# Patient Record
Sex: FEMALE | Race: WHITE | Employment: FULL TIME | URBAN - METROPOLITAN AREA
[De-identification: names, ages, dates, MRNs, and addresses within clinical notes are randomized per-mention and may not be internally consistent; named-entity substitution may affect disease eponyms.]

---

## 2017-05-10 ENCOUNTER — HOSPITAL ENCOUNTER (OUTPATIENT)
Dept: MRI IMAGING | Age: 37
Discharge: HOME | End: 2017-05-10
Payer: COMMERCIAL

## 2017-05-10 DIAGNOSIS — M99.03 LUMBAR REGION SOMATIC DYSFUNCTION: ICD-10-CM

## 2017-05-10 DIAGNOSIS — M47.26 OSTEOARTHRITIS OF SPINE WITH RADICULOPATHY, LUMBAR REGION: ICD-10-CM

## 2017-05-10 DIAGNOSIS — M99.08 RIB CAGE DYSFUNCTION: ICD-10-CM

## 2017-05-10 DIAGNOSIS — M51.36 DISC DEGENERATION, LUMBAR: ICD-10-CM

## 2017-05-10 DIAGNOSIS — M62.81 MUSCLE WEAKNESS: ICD-10-CM

## 2017-05-10 PROCEDURE — 72148 MRI LUMBAR SPINE W/O DYE: CPT

## 2017-07-19 ENCOUNTER — OFFICE VISIT (OUTPATIENT)
Dept: INTERNAL MEDICINE CLINIC | Age: 37
End: 2017-07-19

## 2017-07-19 VITALS
DIASTOLIC BLOOD PRESSURE: 100 MMHG | WEIGHT: 209 LBS | RESPIRATION RATE: 14 BRPM | HEART RATE: 87 BPM | OXYGEN SATURATION: 97 % | HEIGHT: 65 IN | SYSTOLIC BLOOD PRESSURE: 128 MMHG | TEMPERATURE: 98.3 F | BODY MASS INDEX: 34.82 KG/M2

## 2017-07-19 DIAGNOSIS — Z01.818 PRE-OP EVALUATION: Primary | ICD-10-CM

## 2017-07-19 DIAGNOSIS — R03.0 ELEVATED BLOOD PRESSURE READING: ICD-10-CM

## 2017-07-19 DIAGNOSIS — M51.9 LUMBAR DISC DISORDER: ICD-10-CM

## 2017-07-19 RX ORDER — LEVOTHYROXINE SODIUM 137 UG/1
TABLET ORAL
COMMUNITY
Start: 2016-09-14

## 2017-07-19 RX ORDER — GABAPENTIN 300 MG/1
CAPSULE ORAL
Refills: 0 | COMMUNITY
Start: 2017-06-19 | End: 2017-08-22 | Stop reason: ALTCHOICE

## 2017-07-19 RX ORDER — IBUPROFEN 200 MG
800 TABLET ORAL 2 TIMES DAILY
COMMUNITY

## 2017-07-19 RX ORDER — HYDROCODONE BITARTRATE AND ACETAMINOPHEN 5; 325 MG/1; MG/1
TABLET ORAL
Refills: 0 | COMMUNITY
Start: 2017-07-11

## 2017-07-19 RX ORDER — CLINDAMYCIN HYDROCHLORIDE 300 MG/1
CAPSULE ORAL
Refills: 0 | COMMUNITY
Start: 2017-07-14 | End: 2017-08-22 | Stop reason: ALTCHOICE

## 2017-07-19 NOTE — PROGRESS NOTES
32 yo female in for pre op eval for L5-S1 Lumbar [place] on 8/3/17 by Dr. Say Estrada at [facility]. See scanned in form. Repeat BP - 126/100     Plan: Advised lowering sodium intake and keeping well hydrated   Recheck BP in 5 days  ____________________________  Expected course of current diagnosed problem(s) as well as expected progression and possible complications, and desired follow up with provider are discussed with patient. Patient is encouraged to be back in touch with any questions or concerns. Patient expresses understanding of plan of care. Patient is given AVS which includes diagnoses, current medications, vitals.

## 2017-07-19 NOTE — MR AVS SNAPSHOT
Visit Information Date & Time Provider Department Dept. Phone Encounter #  
 7/19/2017  3:20 PM JORGE LUIS [facility] [phone] [id number] Upcoming Health Maintenance Date Due  
 PAP AKA CERVICAL CYTOLOGY 10/20/2003 INFLUENZA AGE 9 TO ADULT 8/1/2017 DTaP/Tdap/Td series (2 - Td) 3/25/2026 Allergies as of 7/19/2017  Review Complete On: 7/19/2017 By: Royal Abner LPN Severity Noted Reaction Type Reactions Amoxicillin  03/25/2016    Other (comments) Unknown; pt states she had a reaction as a baby. Current Immunizations  Reviewed on 3/25/2016 Name Date Tdap 3/25/2016 Not reviewed this visit You Were Diagnosed With   
  
 Codes Comments Pre-op evaluation    -  Primary ICD-10-CM: T66.365 ICD-9-CM: V72.84 Lumbar disc disorder     ICD-10-CM: M51.9 ICD-9-CM: 722.93 Vitals BP Pulse Temp Resp Height(growth percentile) Weight(growth percentile) (!) 148/96 (BP 1 Location: Left arm, BP Patient Position: Sitting) 87 98.3 °F (36.8 °C) (Oral) 14 5' 5\" (1.651 m) 209 lb (94.8 kg) LMP SpO2 BMI OB Status Smoking Status 07/02/2017 97% 34.78 kg/m2 Having regular periods Never Smoker Vitals History BMI and BSA Data Body Mass Index Body Surface Area 34.78 kg/m 2 2.09 m 2 Preferred Pharmacy Pharmacy Name Phone [id number] [address] [phone] Your Updated Medication List  
  
   
This list is accurate as of: 7/19/17  4:21 PM.  Always use your most recent med list. ADVIL 200 mg tablet Generic drug:  ibuprofen Take  by mouth. clindamycin 300 mg capsule Commonly known as:  CLEOCIN  
TAKE 1 TABLET BY MOUTH 3 TIMES A DAY  
  
 gabapentin 300 mg capsule Commonly known as:  NEURONTIN  
TAKE 2 CAPSULES BY MOUTH 3 TIMES DAILY HYDROcodone-acetaminophen 5-325 mg per tablet Commonly known as:  Stacia Zambrano  
 TAKE 1 TABLET BY MOUTH EVERY 6-8 HOURS AS NEEDED FOR PAIN  
  
 LEVOXYL 137 mcg tablet Generic drug:  levothyroxine 137 mcg = 1 tab each dose, PO, daily, # 30 tab, 0 Refills We Performed the Following CBC WITH AUTOMATED DIFF [38701 CPT(R)] METABOLIC PANEL, COMPREHENSIVE [98129 CPT(R)] URINALYSIS W/MICROSCOPIC [54665 CPT(R)] Introducing [facility]! Judith Marie introduces [employer] patient portal. Now you can access parts of your medical record, email your doctor's office, and request medication refills online. 1. In your internet browser, go to [email or web address] 2. Click on the First Time User? Click Here link in the Sign In box. You will see the New Member Sign Up page. 3. Enter your [employer] Access Code exactly as it appears below. You will not need to use this code after youve completed the sign-up process. If you do not sign up before the expiration date, you must request a new code. · [employer] Access Code: [email or web address] Expires: 8/7/2017  5:56 PM 
 
4. Enter the last four digits of your Social Security Number (xxxx) and Date of Birth (mm/dd/yyyy) as indicated and click Submit. You will be taken to the next sign-up page. 5. Create a [employer] ID. This will be your Win[employer] login ID and cannot be changed, so think of one that is secure and easy to remember. 6. Create a WinDensity password. You can change your password at any time. 7. Enter your Password Reset Question and Answer. This can be used at a later time if you forget your password. 8. Enter your e-mail address. You will receive e-mail notification when new information is available in [address] 9. Click Sign Up. You can now view and download portions of your medical record. 10. Click the Download Summary menu link to download a portable copy of your medical information.  
 
If you have questions, please visit the Frequently Asked Questions section of the [employer]. Remember, ApptheGamehart is NOT to be used for urgent needs. For medical emergencies, dial 911. Now available from your iPhone and Android! Please provide this summary of care documentation to your next provider. Your primary care clinician is listed as Sim Emndosa. If you have any questions after today's visit, please call [phone].

## 2017-07-19 NOTE — PROGRESS NOTES
1. Have you been to the ER, urgent care clinic since your last visit? Hospitalized since your last visit? No    2. Have you seen or consulted any other health care providers outside of the [address] [place] [address] since your last visit? Include any pap smears or colon screening. No     Chief Complaint   Patient presents with    Pre-op Exam     pre-op for for lumbar laminectomy L5-S1 on 8/3/2017 with Dr. Mk Goode.       Not fasting

## 2017-07-20 LAB
ALBUMIN SERPL-MCNC: 4.1 G/DL (ref 3.5–5.5)
ALBUMIN/GLOB SERPL: 1.4 {RATIO} (ref 1.2–2.2)
ALP SERPL-CCNC: 72 IU/L (ref 39–117)
ALT SERPL-CCNC: 21 IU/L (ref 0–32)
APPEARANCE UR: CLEAR
AST SERPL-CCNC: 18 IU/L (ref 0–40)
BACTERIA #/AREA URNS HPF: NORMAL /[HPF]
BASOPHILS # BLD AUTO: 0 X10E3/UL (ref 0–0.2)
BASOPHILS NFR BLD AUTO: 1 %
BILIRUB SERPL-MCNC: 0.2 MG/DL (ref 0–1.2)
BILIRUB UR QL STRIP: NEGATIVE
BUN SERPL-MCNC: 17 MG/DL (ref 6–20)
BUN/CREAT SERPL: 21 (ref 9–23)
CALCIUM SERPL-MCNC: 9.5 MG/DL (ref 8.7–10.2)
CASTS URNS QL MICRO: NORMAL /LPF
CHLORIDE SERPL-SCNC: 100 MMOL/L (ref 96–106)
CO2 SERPL-SCNC: 23 MMOL/L (ref 18–29)
COLOR UR: YELLOW
CREAT SERPL-MCNC: 0.8 MG/DL (ref 0.57–1)
EOSINOPHIL # BLD AUTO: 0.2 X10E3/UL (ref 0–0.4)
EOSINOPHIL NFR BLD AUTO: 2 %
EPI CELLS #/AREA URNS HPF: NORMAL /HPF
ERYTHROCYTE [DISTWIDTH] IN BLOOD BY AUTOMATED COUNT: 12.9 % (ref 12.3–15.4)
GLOBULIN SER CALC-MCNC: 3 G/DL (ref 1.5–4.5)
GLUCOSE SERPL-MCNC: 97 MG/DL (ref 65–99)
GLUCOSE UR QL: NEGATIVE
HCT VFR BLD AUTO: 41.3 % (ref 34–46.6)
HGB BLD-MCNC: 13.6 G/DL (ref 11.1–15.9)
HGB UR QL STRIP: NEGATIVE
IMM GRANULOCYTES # BLD: 0 X10E3/UL (ref 0–0.1)
IMM GRANULOCYTES NFR BLD: 0 %
KETONES UR QL STRIP: NEGATIVE
LEUKOCYTE ESTERASE UR QL STRIP: NEGATIVE
LYMPHOCYTES # BLD AUTO: 2.4 X10E3/UL (ref 0.7–3.1)
LYMPHOCYTES NFR BLD AUTO: 28 %
MCH RBC QN AUTO: 30 PG (ref 26.6–33)
MCHC RBC AUTO-ENTMCNC: 32.9 G/DL (ref 31.5–35.7)
MCV RBC AUTO: 91 FL (ref 79–97)
MICRO URNS: NORMAL
MICRO URNS: NORMAL
MONOCYTES # BLD AUTO: 0.8 X10E3/UL (ref 0.1–0.9)
MONOCYTES NFR BLD AUTO: 9 %
MUCOUS THREADS URNS QL MICRO: PRESENT
NEUTROPHILS # BLD AUTO: 5.2 X10E3/UL (ref 1.4–7)
NEUTROPHILS NFR BLD AUTO: 60 %
NITRITE UR QL STRIP: NEGATIVE
PH UR STRIP: 7 [PH] (ref 5–7.5)
PLATELET # BLD AUTO: 407 X10E3/UL (ref 150–379)
POTASSIUM SERPL-SCNC: 4.4 MMOL/L (ref 3.5–5.2)
PROT SERPL-MCNC: 7.1 G/DL (ref 6–8.5)
PROT UR QL STRIP: NEGATIVE
RBC # BLD AUTO: 4.53 X10E6/UL (ref 3.77–5.28)
RBC #/AREA URNS HPF: NORMAL /HPF
SODIUM SERPL-SCNC: 140 MMOL/L (ref 134–144)
SP GR UR: 1.02 (ref 1–1.03)
UROBILINOGEN UR STRIP-MCNC: 0.2 MG/DL (ref 0.2–1)
WBC # BLD AUTO: 8.7 X10E3/UL (ref 3.4–10.8)
WBC #/AREA URNS HPF: NORMAL /HPF

## 2017-07-24 ENCOUNTER — OFFICE VISIT (OUTPATIENT)
Dept: INTERNAL MEDICINE CLINIC | Age: 37
End: 2017-07-24

## 2017-07-24 VITALS
BODY MASS INDEX: 34.89 KG/M2 | HEART RATE: 80 BPM | WEIGHT: 209.4 LBS | HEIGHT: 65 IN | OXYGEN SATURATION: 98 % | DIASTOLIC BLOOD PRESSURE: 100 MMHG | RESPIRATION RATE: 16 BRPM | TEMPERATURE: 98.2 F | SYSTOLIC BLOOD PRESSURE: 128 MMHG

## 2017-07-24 DIAGNOSIS — R03.0 ELEVATED BLOOD PRESSURE, SITUATIONAL: Primary | ICD-10-CM

## 2017-07-24 NOTE — MR AVS SNAPSHOT
Visit Information Date & Time Provider Department Dept. Phone Encounter #  
 7/24/2017  8:40 AM JORGE LUIS [place]  [facility] [phone] Follow-up Instructions Return in about 6 weeks (around 9/4/2017) for establish for care and BP f/u. Upcoming Health Maintenance Date Due  
 PAP AKA CERVICAL CYTOLOGY 10/20/2003 INFLUENZA AGE 9 TO ADULT 8/1/2017 DTaP/Tdap/Td series (2 - Td) 3/25/2026 Allergies as of 7/24/2017  Review Complete On: 7/24/2017 By: Ashlie Arauz Severity Noted Reaction Type Reactions Amoxicillin  03/25/2016    Other (comments) Unknown; pt states she had a reaction as a baby. Current Immunizations  Reviewed on 3/25/2016 Name Date Tdap 3/25/2016 Not reviewed this visit You Were Diagnosed With   
  
 Codes Comments Elevated blood pressure, situational    -  Primary ICD-10-CM: R03.0 ICD-9-CM: 796.2 Vitals BP Pulse Temp Resp Height(growth percentile) Weight(growth percentile) (!) 128/100 (BP 1 Location: Right arm) 80 98.2 °F (36.8 °C) (Oral) 16 5' 5\" (1.651 m) 209 lb 6.4 oz (95 kg) LMP SpO2 BMI OB Status Smoking Status 07/02/2017 98% 34.85 kg/m2 Having regular periods Never Smoker Vitals History BMI and BSA Data Body Mass Index Body Surface Area 34.85 kg/m 2 2.09 m 2 Preferred Pharmacy Pharmacy Name Phone [id number], [address] [facility] [phone] Your Updated Medication List  
  
   
This list is accurate as of: 7/24/17  9:23 AM.  Always use your most recent med list. ADVIL 200 mg tablet Generic drug:  ibuprofen Take 800 mg by mouth two (2) times a day. clindamycin 300 mg capsule Commonly known as:  CLEOCIN  
TAKE 1 TABLET BY MOUTH 3 TIMES A DAY  
  
 gabapentin 300 mg capsule Commonly known as:  NEURONTIN  
TAKE 2 CAPSULES BY MOUTH 3 TIMES DAILY HYDROcodone-acetaminophen 5-325 mg per tablet Commonly known as:  NORCO  
TAKE 1 TABLET BY MOUTH EVERY 6-8 HOURS AS NEEDED FOR PAIN  
  
 LEVOXYL 137 mcg tablet Generic drug:  levothyroxine 137 mcg = 1 tab each dose, PO, daily, # 30 tab, 0 Refills Follow-up Instructions Return in about 6 weeks (around 9/4/2017) for establish for care and BP f/u. Introducing [facility] & HE[facility] SERVICES! Ashley Prince introduces [facility] patient portal. Now you can access parts of your medical record, email your doctor's office, and request medication refills online. 1. In your internet browser, go to [email or web address] 2. Click on the First Time User? Click Here link in the Sign In box. You will see the New Member Sign Up page. 3. Enter your [facility] Access Code exactly as it appears below. You will not need to use this code after youve completed the sign-up process. If you do not sign up before the expiration date, you must request a new code. · [facility] Access Code: [email or web address] Expires: 8/7/2017  5:56 PM 
 
4. Enter the last four digits of your Social Security Number (xxxx) and Date of Birth (mm/dd/yyyy) as indicated and click Submit. You will be taken to the next sign-up page. 5. Create a [facility] ID. This will be your [facility] login ID and cannot be changed, so think of one that is secure and easy to remember. 6. Create a [facility] password. You can change your password at any time. 7. Enter your Password Reset Question and Answer. This can be used at a later time if you forget your password. 8. Enter your e-mail address. You will receive e-mail notification when new information is available in [address] 9. Click Sign Up. You can now view and download portions of your medical record. 10. Click the Download Summary menu link to download a portable copy of your medical information.  
 
If you have questions, please visit the Frequently Asked Questions section of the [employer]. Remember, [employer]DOMhart is NOT to be used for urgent needs. For medical emergencies, dial 911. Now available from your iPhone and Android! Please provide this summary of care documentation to your next provider. Your primary care clinician is listed as Rebekah Neal. If you have any questions after today's visit, please call [phone].

## 2017-07-24 NOTE — PROGRESS NOTES
30 yo female returns for BP f/u. She is pre-op for lumbar surgery on Aug 3. She has been using an NSAID consistently for at least a month for her sciatic back pain. PE: Overweight WF   Repeat BP - R = 128/100 & L = 128/94 (she is also in pain with her back at this time)    Imp: Elevated BP - possibly due to NSAID use    Plan: Discussed avoidance of sodium in diet   Labs show normal renal fx   Will clear for surgery   F/U and establish for care with Marty Estrella NP, in 6 weeks  ___________________________  Expected course of current diagnosed problem(s) as well as expected progression and possible complications, and desired follow up with provider are discussed with patient. Patient is encouraged to be back in touch with any questions or concerns. Patient expresses understanding of plan of care. Patient is given AVS which includes diagnoses, current medications, vitals.

## 2017-07-24 NOTE — PROGRESS NOTES
1. Have you been to the ER, urgent care clinic since your last visit? Hospitalized since your last visit? No    2. Have you seen or consulted any other health care providers outside of the [facility] since your last visit? Include any pap smears or colon screening. No     Chief Complaint   Patient presents with    Hypertension     5 day follow up       Not Fasting.

## 2017-08-22 ENCOUNTER — OFFICE VISIT (OUTPATIENT)
Dept: INTERNAL MEDICINE CLINIC | Age: 37
End: 2017-08-22

## 2017-08-22 VITALS
DIASTOLIC BLOOD PRESSURE: 74 MMHG | TEMPERATURE: 98.2 F | HEART RATE: 85 BPM | OXYGEN SATURATION: 98 % | HEIGHT: 65 IN | BODY MASS INDEX: 34.82 KG/M2 | SYSTOLIC BLOOD PRESSURE: 130 MMHG | WEIGHT: 209 LBS | RESPIRATION RATE: 16 BRPM

## 2017-08-22 DIAGNOSIS — K21.9 GASTROESOPHAGEAL REFLUX DISEASE, ESOPHAGITIS PRESENCE NOT SPECIFIED: ICD-10-CM

## 2017-08-22 DIAGNOSIS — J02.9 SORE THROAT: Primary | ICD-10-CM

## 2017-08-22 LAB
S PYO AG THROAT QL: NEGATIVE
VALID INTERNAL CONTROL?: YES

## 2017-08-22 RX ORDER — RANITIDINE 150 MG/1
150 TABLET, FILM COATED ORAL 2 TIMES DAILY
Qty: 28 TAB | Refills: 0 | Status: SHIPPED | OUTPATIENT
Start: 2017-08-22 | End: 2017-09-05

## 2017-08-22 NOTE — PATIENT INSTRUCTIONS

## 2017-08-22 NOTE — PROGRESS NOTES
HISTORY OF PRESENT ILLNESS  Helen Post is a 31 y.o. female. Patient reports sore throat for 6 days on and off. She has been doing a new diet for the last 3 weeks, which includes a daily shake with berries. She doesn't do the shakes on the weekends and reports she didn't notice the sore throat over the weekend. She has also been eating a lot of fresh vegetables. She had tomatoes over the weekend. She denies fever, chills, cough, or congestion. She had one day of hoarseness. She has felt the need to burp frequently. She has history of heartburn, but this feels different. She also has history of allergies and is not taking anything currently. Visit Vitals    /74 (BP 1 Location: Left arm, BP Patient Position: Sitting)    Pulse 85    Temp 98.2 °F (36.8 °C) (Oral)    Resp 16    Ht 5' 5\" (1.651 m)    Wt 209 lb (94.8 kg)    LMP 08/17/2017    SpO2 98%    BMI 34.78 kg/m2       Sore Throat    The history is provided by the patient. This is a new problem. Episode onset: 6 days ago. There has been no fever. Pertinent negatives include no congestion and no cough. Treatments tried: 1 dose of zantac. The treatment provided no relief. Review of Systems   HENT: Positive for sore throat. Negative for congestion. Respiratory: Negative for cough. Physical Exam   Constitutional: She is oriented to person, place, and time. She appears well-developed and well-nourished. HENT:   Head: Normocephalic. Right Ear: Hearing, tympanic membrane, external ear and ear canal normal.   Left Ear: Hearing, tympanic membrane, external ear and ear canal normal.   Nose: Mucosal edema present. Mouth/Throat: Uvula is midline, oropharynx is clear and moist and mucous membranes are normal.   Cardiovascular: Normal rate, regular rhythm and normal heart sounds. Pulmonary/Chest: Effort normal and breath sounds normal.   Lymphadenopathy:     She has no cervical adenopathy.    Neurological: She is alert and oriented to person, place, and time. Skin: Skin is warm and dry. Psychiatric: She has a normal mood and affect. ASSESSMENT and PLAN    ICD-10-CM ICD-9-CM    1. Sore throat J02.9 462 AMB POC RAPID STREP A      UPPER RESPIRATORY CULTURE   2.  Gastroesophageal reflux disease, esophagitis presence not specified K21.9 530.81      Orders Placed This Encounter    UPPER RESPIRATORY CULTURE    AMB POC RAPID STREP A    raNITIdine (ZANTAC) 150 mg tablet   start zantac twice daily; follow-up if no relief in 1 week  Avoid trigger foods as discussed, such as tomatoes, acidic fruit, caffeine, coffee, wine  Eat 5-6 small meals  Avoid eating 2-3 hours before bed

## 2017-08-22 NOTE — MR AVS SNAPSHOT
Visit Information Date & Time Provider Department Dept. Phone Encounter #  
 8/22/2017  3:45 PM JORGE LUIS [facility] [phone] Upcoming Health Maintenance Date Due  
 PAP AKA CERVICAL CYTOLOGY 10/20/2003 INFLUENZA AGE 9 TO ADULT 8/1/2017 DTaP/Tdap/Td series (2 - Td) 3/25/2026 Allergies as of 8/22/2017  Review Complete On: 8/22/2017 By: Latrell Liu Severity Noted Reaction Type Reactions Amoxicillin  03/25/2016    Other (comments) Unknown; pt states she had a reaction as a baby. Current Immunizations  Reviewed on 3/25/2016 Name Date Tdap 3/25/2016 Not reviewed this visit You Were Diagnosed With   
  
 Codes Comments Sore throat    -  Primary ICD-10-CM: J02.9 ICD-9-CM: 145 Gastroesophageal reflux disease, esophagitis presence not specified     ICD-10-CM: K21.9 ICD-9-CM: 530.81 Vitals BP Pulse Temp Resp Height(growth percentile) Weight(growth percentile) 130/74 (BP 1 Location: Left arm, BP Patient Position: Sitting) 85 98.2 °F (36.8 °C) (Oral) 16 5' 5\" (1.651 m) 209 lb (94.8 kg) LMP SpO2 BMI OB Status Smoking Status 08/17/2017 98% 34.78 kg/m2 Having regular periods Never Smoker Vitals History BMI and BSA Data Body Mass Index Body Surface Area 34.78 kg/m 2 2.09 m 2 Preferred Pharmacy Pharmacy Name Phone [id number], [address] [phone] Your Updated Medication List  
  
   
This list is accurate as of: 8/22/17  4:35 PM.  Always use your most recent med list. ADVIL 200 mg tablet Generic drug:  ibuprofen Take 800 mg by mouth two (2) times a day. HYDROcodone-acetaminophen 5-325 mg per tablet Commonly known as:  NORCO  
TAKE 1 TABLET BY MOUTH EVERY 6-8 HOURS AS NEEDED FOR PAIN  
  
 LEVOXYL 137 mcg tablet Generic drug:  levothyroxine 137 mcg = 1 tab each dose, PO, daily, # 30 tab, 0 Refills  
  
 raNITIdine 150 mg tablet Commonly known as:  ZANTAC Take 1 Tab by mouth two (2) times a day for 14 days. Prescriptions Sent to Pharmacy Refills  
 raNITIdine (ZANTAC) 150 mg tablet 0 Sig: Take 1 Tab by mouth two (2) times a day for 14 days. Class: Normal  
 Pharmacy: [address] [place] [address] #: [phone] Route: Oral  
  
We Performed the Following AMB POC RAPID STREP A [17448 CPT(R)] Patient Instructions Gastroesophageal Reflux Disease (GERD): Care Instructions Your Care Instructions Gastroesophageal reflux disease (GERD) is the backward flow of stomach acid into the esophagus. The esophagus is the tube that leads from your throat to your stomach. A one-way valve prevents the stomach acid from moving up into this tube. When you have GERD, this valve does not close tightly enough. If you have mild GERD symptoms including heartburn, you may be able to control the problem with antacids or over-the-counter medicine. Changing your diet, losing weight, and making other lifestyle changes can also help reduce symptoms. Follow-up care is a key part of your treatment and safety. Be sure to make and go to all appointments, and call your doctor if you are having problems. Its also a good idea to know your test results and keep a list of the medicines you take. How can you care for yourself at home? · Take your medicines exactly as prescribed. Call your doctor if you think you are having a problem with your medicine. · Your doctor may recommend over-the-counter medicine. For mild or occasional indigestion, antacids, such as Tums, Gaviscon, Mylanta, or Maalox, may help. Your doctor also may recommend over-the-counter acid reducers, such as Pepcid AC, Tagamet HB, Zantac 75, or Prilosec. Read and follow all instructions on the label.  If you use these medicines often, talk with your doctor. · Change your eating habits. ¨ Its best to eat several small meals instead of two or three large meals. ¨ After you eat, wait 2 to 3 hours before you lie down. ¨ Chocolate, mint, and alcohol can make GERD worse. ¨ Spicy foods, foods that have a lot of acid (like tomatoes and oranges), and coffee can make GERD symptoms worse in some people. If your symptoms are worse after you eat a certain food, you may want to stop eating that food to see if your symptoms get better. · Do not smoke or chew tobacco. Smoking can make GERD worse. If you need help quitting, talk to your doctor about stop-smoking programs and medicines. These can increase your chances of quitting for good. · If you have GERD symptoms at night, raise the head of your bed 6 to 8 inches by putting the frame on blocks or placing a foam wedge under the head of your mattress. (Adding extra pillows does not work.) · Do not wear tight clothing around your middle. · Lose weight if you need to. Losing just 5 to 10 pounds can help. When should you call for help? Call your doctor now or seek immediate medical care if: 
· You have new or different belly pain. · Your stools are black and tarlike or have streaks of blood. Watch closely for changes in your health, and be sure to contact your doctor if: 
· Your symptoms have not improved after 2 days. · Food seems to catch in your throat or chest. 
Where can you learn more? Go to http://dudley-washington.info/. Enter G101 in the search box to learn more about \"Gastroesophageal Reflux Disease (GERD): Care Instructions. \" Current as of: August 9, 2016 Content Version: 11.3 © 6083-1570 [employer]. Care instructions adapted under license by [employer] (which disclaims liability or warranty for this information).  If you have questions about a medical condition or this instruction, always ask your healthcare professional. [employer] disclaims any warranty or liability for your use of this information. Introducing [facility] & HEALTH SERVICES! [employer] introduces [employer] patient portal. Now you can access parts of your medical record, email your doctor's office, and request medication refills online. 1. In your internet browser, go to [email or web address] 2. Click on the First Time User? Click Here link in the Sign In box. You will see the New Member Sign Up page. 3. Enter your [employer] Access Code exactly as it appears below. You will not need to use this code after youve completed the sign-up process. If you do not sign up before the expiration date, you must request a new code. · [employer] Access Code: [email or web address] Expires: 11/20/2017  4:26 PM 
 
4. Enter the last four digits of your Social Security Number (xxxx) and Date of Birth (mm/dd/yyyy) as indicated and click Submit. You will be taken to the next sign-up page. 5. Create a [employer] ID. This will be your [employer] login ID and cannot be changed, so think of one that is secure and easy to remember. 6. Create a [employer] password. You can change your password at any time. 7. Enter your Password Reset Question and Answer. This can be used at a later time if you forget your password. 8. Enter your e-mail address. You will receive e-mail notification when new information is available in [address] 9. Click Sign Up. You can now view and download portions of your medical record. 10. Click the Download Summary menu link to download a portable copy of your medical information. If you have questions, please visit the Frequently Asked Questions section of the [employer] website. Remember, [employer] is NOT to be used for urgent needs. For medical emergencies, dial 911. Now available from your iPhone and Android! Please provide this summary of care documentation to your next provider. Your primary care clinician is listed as Candice Driscoll. If you have any questions after today's visit, please call [phone].

## 2017-08-22 NOTE — PROGRESS NOTES
1. Have you been to the ER, urgent care clinic since your last visit? Hospitalized since your last visit? No    2. Have you seen or consulted any other health care providers outside of the [address] [place] [address] since your last visit? Include any pap smears or colon screening. No  Chief Complaint   Patient presents with    Sore Throat     feels like acid reflux x 5 days.  no fever chills or body aches

## 2017-08-25 LAB
BACTERIA SPEC RESP CULT: ABNORMAL
BACTERIA SPEC RESP CULT: ABNORMAL

## 2017-08-28 ENCOUNTER — TELEPHONE (OUTPATIENT)
Dept: FAMILY MEDICINE CLINIC | Age: 37
End: 2017-08-28

## 2017-08-28 RX ORDER — AZITHROMYCIN 250 MG/1
TABLET, FILM COATED ORAL
Qty: 6 TAB | Refills: 0 | Status: SHIPPED | OUTPATIENT
Start: 2017-08-28 | End: 2017-09-02

## 2017-08-28 NOTE — TELEPHONE ENCOUNTER
Message left after attempting to call patient twice with no answer.  Antibiotic sent to pharmacy on file due to positive strep C

## 2017-08-29 ENCOUNTER — TELEPHONE (OUTPATIENT)
Dept: INTERNAL MEDICINE CLINIC | Age: 37
End: 2017-08-29

## 2017-08-30 ENCOUNTER — TELEPHONE (OUTPATIENT)
Dept: INTERNAL MEDICINE CLINIC | Age: 37
End: 2017-08-30

## 2017-08-30 NOTE — TELEPHONE ENCOUNTER
Patient notified of abnormal culture; patient had received my message 2 days ago and started antibiotic. She is feeling better. advised new toothbrush 24 hours after starting antibiotic.

## 2023-03-20 ENCOUNTER — OFFICE VISIT (OUTPATIENT)
Dept: GYNECOLOGY | Age: 43
End: 2023-03-20
Payer: COMMERCIAL

## 2023-03-20 VITALS
SYSTOLIC BLOOD PRESSURE: 119 MMHG | HEART RATE: 99 BPM | DIASTOLIC BLOOD PRESSURE: 82 MMHG | WEIGHT: 218 LBS | HEIGHT: 65 IN | BODY MASS INDEX: 36.32 KG/M2

## 2023-03-20 DIAGNOSIS — Z01.818 PREOP TESTING: ICD-10-CM

## 2023-03-20 DIAGNOSIS — D25.1 INTRAMURAL LEIOMYOMA OF UTERUS: Primary | ICD-10-CM

## 2023-03-20 LAB
ALBUMIN SERPL-MCNC: 4 G/DL (ref 3.8–4.8)
ALBUMIN/GLOB SERPL: 1.5 {RATIO} (ref 1.2–2.2)
ALP SERPL-CCNC: 78 IU/L (ref 44–121)
ALT SERPL-CCNC: 10 IU/L (ref 0–32)
AST SERPL-CCNC: 13 IU/L (ref 0–40)
BASOPHILS # BLD AUTO: 0.1 X10E3/UL (ref 0–0.2)
BASOPHILS NFR BLD AUTO: 1 %
BILIRUB SERPL-MCNC: 0.2 MG/DL (ref 0–1.2)
BUN SERPL-MCNC: 10 MG/DL (ref 6–24)
BUN/CREAT SERPL: 17 (ref 9–23)
CALCIUM SERPL-MCNC: 9.4 MG/DL (ref 8.7–10.2)
CHLORIDE SERPL-SCNC: 98 MMOL/L (ref 96–106)
CO2 SERPL-SCNC: 25 MMOL/L (ref 20–29)
CREAT SERPL-MCNC: 0.58 MG/DL (ref 0.57–1)
EGFRCR SERPLBLD CKD-EPI 2021: 117 ML/MIN/1.73
EOSINOPHIL # BLD AUTO: 0.3 X10E3/UL (ref 0–0.4)
EOSINOPHIL NFR BLD AUTO: 3 %
ERYTHROCYTE [DISTWIDTH] IN BLOOD BY AUTOMATED COUNT: 16.8 % (ref 11.7–15.4)
GLOBULIN SER CALC-MCNC: 2.7 G/DL (ref 1.5–4.5)
GLUCOSE SERPL-MCNC: 98 MG/DL (ref 70–99)
HCT VFR BLD AUTO: 26.8 % (ref 34–46.6)
HGB BLD-MCNC: 7.9 G/DL (ref 11.1–15.9)
LYMPHOCYTES # BLD AUTO: 1.6 X10E3/UL (ref 0.7–3.1)
LYMPHOCYTES NFR BLD AUTO: 18 %
MCH RBC QN AUTO: 19.2 PG (ref 26.6–33)
MCHC RBC AUTO-ENTMCNC: 29.5 G/DL (ref 31.5–35.7)
MCV RBC AUTO: 65 FL (ref 79–97)
MONOCYTES # BLD AUTO: 0.6 X10E3/UL (ref 0.1–0.9)
MONOCYTES NFR BLD AUTO: 7 %
MORPHOLOGY BLD-IMP: ABNORMAL
NEUTROPHILS # BLD AUTO: 6.2 X10E3/UL (ref 1.4–7)
NEUTROPHILS NFR BLD AUTO: 71 %
PLATELET # BLD AUTO: 464 X10E3/UL (ref 150–450)
POTASSIUM SERPL-SCNC: 4.6 MMOL/L (ref 3.5–5.2)
PROT SERPL-MCNC: 6.7 G/DL (ref 6–8.5)
RBC # BLD AUTO: 4.11 X10E6/UL (ref 3.77–5.28)
SODIUM SERPL-SCNC: 133 MMOL/L (ref 134–144)
WBC # BLD AUTO: 8.8 X10E3/UL (ref 3.4–10.8)

## 2023-03-20 PROCEDURE — 99204 OFFICE O/P NEW MOD 45 MIN: CPT | Performed by: OBSTETRICS & GYNECOLOGY

## 2023-03-20 RX ORDER — ERGOCALCIFEROL 1.25 MG/1
CAPSULE ORAL
COMMUNITY
Start: 2023-02-23

## 2023-03-20 RX ORDER — LEVOTHYROXINE SODIUM 175 UG/1
TABLET ORAL
COMMUNITY
Start: 2023-03-02 | End: 2023-03-21

## 2023-03-20 RX ORDER — IRON,CARBONYL/ASCORBIC ACID 65MG-125MG
TABLET, DELAYED RELEASE (ENTERIC COATED) ORAL
COMMUNITY
End: 2023-03-23

## 2023-03-20 NOTE — PROGRESS NOTES
27 [facility] [address], [address] [facility]  P [phone]  F [phone]    Office Note  Patient ID:  Name:  Freya Fowler  MRN:  [id number]  :  1980/42 y.o. Date:  3/20/2023      HISTORY OF PRESENT ILLNESS:  Freya Fowler is a 38 y.o.  premenopausal female who is a new patient being seen for uterine fibroids. She is referred by Dr. Mark Francis with [facility]. She is symptomatic and anemic from the fibroids. Her last Hgb was 8.8. Options were discussed with her and she wished to proceed with hysterectomy. I have been asked to see her in consultation. ROS:   and GI review:  Negative  Cardiopulmonary review:  Negative   Musculoskeletal:  Negative    A comprehensive review of systems was negative except for that written in the History of Present Illness. , 10 point ROS      OB/GYN ROS/HX:          Problem List:  Patient Active Problem List    Diagnosis Date Noted    Advanced care planning/counseling discussion 2016    Uterine fibroid 2016    Hypothyroidism 2015     PMH:  Past Medical History:   Diagnosis Date    Chronic pain     Hypothyroidism 2015    Dr. Shama Harmon of skin [facility]) 2016    R distal thigh (Dr. Philipp Frankel at [address] [facility]) - later pathology showed NOT melanoma    Uterine fibroid 3/2016      PSH:  Past Surgical History:   Procedure Laterality Date    HX OTHER SURGICAL Right 2015    dermatofibroma excision (upper R thigh), Dr. Dalila Keyes     [facility] [address] [place] [place] Right 2016    spitz nevus exc R knee (pathology revealed scar), Dr. Philipp Frankel at [address] [facility] History:  Social History     Tobacco Use    Smoking status: Never    Smokeless tobacco: Never   Substance Use Topics    Alcohol use: Yes     Comment: 4 beers/wine a week      Family History:  Family History   Problem Relation Age of Onset    No Known Problems Mother     No Known Problems Father     Cancer Maternal Grandmother         ovarian    Cancer Maternal Grandfather         lung & liver CA    Cancer Paternal Grandmother         rectal CA    Anesth Problems Neg Hx       Medications: (reviewed)  Current Outpatient Medications   Medication Sig    levothyroxine (SYNTHROID) 175 mcg tablet 1 tab(s) orally once a day for 90 days    ergocalciferol (ERGOCALCIFEROL) 1,250 mcg (50,000 unit) capsule TAKE 1 CAPSULE BY MOUTH WEEKLY FOR 90 DAYS    iron,carbonyl-vitamin C (Vitron-C) 65 mg iron- 125 mg TbEC Take  by mouth. HYDROcodone-acetaminophen (NORCO) 5-325 mg per tablet TAKE 1 TABLET BY MOUTH EVERY 6-8 HOURS AS NEEDED FOR PAIN (Patient not taking: Reported on 3/20/2023)    levothyroxine (SYNTHROID) 137 mcg tablet   137 mcg = 1 tab each dose, PO, daily, # 30 tab, 0 Refills (Patient not taking: Reported on 3/20/2023)    ibuprofen (MOTRIN) 200 mg tablet Take 800 mg by mouth two (2) times a day. (Patient not taking: Reported on 3/20/2023)     No current facility-administered medications for this visit. Allergies: (reviewed)  Allergies   Allergen Reactions    Amoxicillin Other (comments)     Unknown; pt states she had a reaction as a baby. OBJECTIVE:    Physical Exam:  VITAL SIGNS: Vitals:    03/20/23 1047   BP: 119/82   Pulse: 99   Weight: 218 lb (98.9 kg)   Height: 5' 5\" (1.651 m)     Body mass index is 36.28 kg/m². GENERAL FAWN: Conversant, alert, oriented. No acute distress. HEENT: HEENT. No thyroid enlargement. No JVD. Neck: Supple without restrictions. RESPIRATORY: Clear to auscultation and percussion to the bases. No CVAT. CARDIOVASC: RRR without murmur/rub. GASTROINT: soft, non-tender, without masses or organomegaly   MUSCULOSKEL: no joint tenderness, deformity or swelling   EXTREMITIES: extremities normal, atraumatic, no cyanosis or edema   PELVIC: Normal external genitalia. Normal vagina and cervix. Uterus enlarged, with multiple fibroids, but somewhat mobile.   Adnexa not palpable. RECTAL: Deferred   JAMARI SURVEY: No suspicious lymphadenopathy or edema noted. NEURO: Grossly intact. No acute deficit. Lab Data:    Lab Results   Component Value Date/Time    WBC 8.7 07/19/2017 04:50 PM    HGB 13.6 07/19/2017 04:50 PM    HCT 41.3 07/19/2017 04:50 PM    PLATELET 286 (H) 75/20/6918 04:50 PM    MCV 91 07/19/2017 04:50 PM     Lab Results   Component Value Date/Time    Sodium 140 07/19/2017 04:50 PM    Potassium 4.4 07/19/2017 04:50 PM    Chloride 100 07/19/2017 04:50 PM    CO2 23 07/19/2017 04:50 PM    Glucose 97 07/19/2017 04:50 PM    BUN 17 07/19/2017 04:50 PM    Creatinine 0.80 07/19/2017 04:50 PM    BUN/Creatinine ratio 21 07/19/2017 04:50 PM    GFR est  07/19/2017 04:50 PM    GFR est non-AA 96 07/19/2017 04:50 PM    Calcium 9.5 07/19/2017 04:50 PM         Imaging: Outside ultrasound report from [address] [facility] reviewed. Enlarged fibroid uterus measuring 9.3 x 7.5 x 11.2 cm. Multiple fibroids present, the largest measuring 7.4 cm in greatest dimension. Right ovary not seen. Left ovary mildly enlarged. No free fluid. IMPRESSION/PLAN:  Nirmal Phillips is a 38 y.o. female with a diagnosis of symptomatic uterine fibroids. She does not desire future fertility and would like to proceed with definitive management. I reviewed with Nirmal Phillips her medical records, physical exam, and review of symptoms. Despite the size of her uterus, I still think the procedure can be accomplished in a minimally invasive fashion. I suggested a robotic approach, as I think I might have to perform multiple myomectomies at the end of the procedure to allow for transvaginal removal of the specimen. I suggested that we leave her ovaries, as long as they appear normal, to prevent the acute onset of menopause. She was counseled on the risks, benefits, indications and alternatives of the planned procedure.   We discussed the possible surgical risks of bleeding, infection, potential injury to other organs/structures, and the risks of anesthesia. Her questions were answered to her satisfaction and she wishes to proceed as planned. An electronic signature was used to sign this note.     Lissette Gagnon MD  03/20/23

## 2023-03-20 NOTE — LETTER
3/20/2023 11:36 AM    Patient:  Nirmal Phillips   YOB: 1980  Date of Visit: 3/20/2023      Dear Sue Ramon MD  [address] [address]  Via Fax: [phone]: Thank you for referring Ms. Tiffany Vicente to me for evaluation/treatment. Below are the relevant portions of my assessment and plan of care. New patient referred by  for fibroids. [address] [place] [address] [facility] [address] [facility] [phone]  F [phone]    Office Note  Patient ID:  Name:  Nirmal Phillips  MRN:  [id number]  :  1980/42 y.o. Date:  3/20/2023      HISTORY OF PRESENT ILLNESS:  Nirmal Phillips is a 38 y.o.  premenopausal female who is a new patient being seen for uterine fibroids. She is referred by Dr. Akash Driver with [facility]. She is symptomatic and anemic from the fibroids. Her last Hgb was 8.8. Options were discussed with her and she wished to proceed with hysterectomy. I have been asked to see her in consultation. ROS:   and GI review:  Negative  Cardiopulmonary review:  Negative   Musculoskeletal:  Negative    A comprehensive review of systems was negative except for that written in the History of Present Illness. , 10 point ROS      OB/GYN ROS/HX:          Problem List:  Patient Active Problem List    Diagnosis Date Noted    Advanced care planning/counseling discussion 2016    Uterine fibroid 2016    Hypothyroidism 2015     PMH:  Past Medical History:   Diagnosis Date    Chronic pain     Hypothyroidism 2015    Dr. Mike Boothe of skin [facility]) 2016    R distal thigh (Dr. Jane Reddy at [address] [place] [address]) - later pathology showed NOT melanoma    Uterine fibroid 3/2016      PSH:  Past Surgical History:   Procedure Laterality Date    HX OTHER SURGICAL Right 2015    dermatofibroma excision (upper R thigh), Dr. Le Denny SURGICAL Right 09/14/2016    spitz nevus exc R knee (pathology revealed scar), Dr. Washington Silveira at [address] History:  Social History     Tobacco Use    Smoking status: Never    Smokeless tobacco: Never   Substance Use Topics    Alcohol use: Yes     Comment: 4 beers/wine a week      Family History:  Family History   Problem Relation Age of Onset    No Known Problems Mother     No Known Problems Father     Cancer Maternal Grandmother         ovarian    Cancer Maternal Grandfather         lung & liver CA    Cancer Paternal Grandmother         rectal CA    Anesth Problems Neg Hx       Medications: (reviewed)  Current Outpatient Medications   Medication Sig    levothyroxine (SYNTHROID) 175 mcg tablet 1 tab(s) orally once a day for 90 days    ergocalciferol (ERGOCALCIFEROL) 1,250 mcg (50,000 unit) capsule TAKE 1 CAPSULE BY MOUTH WEEKLY FOR 90 DAYS    iron,carbonyl-vitamin C (Vitron-C) 65 mg iron- 125 mg TbEC Take  by mouth.  HYDROcodone-acetaminophen (NORCO) 5-325 mg per tablet TAKE 1 TABLET BY MOUTH EVERY 6-8 HOURS AS NEEDED FOR PAIN (Patient not taking: Reported on 3/20/2023)    levothyroxine (SYNTHROID) 137 mcg tablet   137 mcg = 1 tab each dose, PO, daily, # 30 tab, 0 Refills (Patient not taking: Reported on 3/20/2023)    ibuprofen (MOTRIN) 200 mg tablet Take 800 mg by mouth two (2) times a day. (Patient not taking: Reported on 3/20/2023)     No current facility-administered medications for this visit. Allergies: (reviewed)  Allergies   Allergen Reactions    Amoxicillin Other (comments)     Unknown; pt states she had a reaction as a baby. OBJECTIVE:    Physical Exam:  VITAL SIGNS: Vitals:    03/20/23 1047   BP: 119/82   Pulse: 99   Weight: 218 lb (98.9 kg)   Height: 5' 5\" (1.651 m)     Body mass index is 36.28 kg/m². GENERAL FAWN: Conversant, alert, oriented. No acute distress. HEENT: HEENT. No thyroid enlargement. No JVD.    Neck: Supple without restrictions. RESPIRATORY: Clear to auscultation and percussion to the bases. No CVAT. CARDIOVASC: RRR without murmur/rub. GASTROINT: soft, non-tender, without masses or organomegaly   MUSCULOSKEL: no joint tenderness, deformity or swelling   EXTREMITIES: extremities normal, atraumatic, no cyanosis or edema   PELVIC: Normal external genitalia. Normal vagina and cervix. Uterus enlarged, with multiple fibroids, but somewhat mobile. Adnexa not palpable. RECTAL: Deferred   JAMARI SURVEY: No suspicious lymphadenopathy or edema noted. NEURO: Grossly intact. No acute deficit. Lab Data:    Lab Results   Component Value Date/Time    WBC 8.7 07/19/2017 04:50 PM    HGB 13.6 07/19/2017 04:50 PM    HCT 41.3 07/19/2017 04:50 PM    PLATELET 706 (H) 39/27/6255 04:50 PM    MCV 91 07/19/2017 04:50 PM     Lab Results   Component Value Date/Time    Sodium 140 07/19/2017 04:50 PM    Potassium 4.4 07/19/2017 04:50 PM    Chloride 100 07/19/2017 04:50 PM    CO2 23 07/19/2017 04:50 PM    Glucose 97 07/19/2017 04:50 PM    BUN 17 07/19/2017 04:50 PM    Creatinine 0.80 07/19/2017 04:50 PM    BUN/Creatinine ratio 21 07/19/2017 04:50 PM    GFR est  07/19/2017 04:50 PM    GFR est non-AA 96 07/19/2017 04:50 PM    Calcium 9.5 07/19/2017 04:50 PM         Imaging: Outside ultrasound report from [facility] reviewed. Enlarged fibroid uterus measuring 9.3 x 7.5 x 11.2 cm. Multiple fibroids present, the largest measuring 7.4 cm in greatest dimension. Right ovary not seen. Left ovary mildly enlarged. No free fluid. IMPRESSION/PLAN:  Lorna Thomas is a 38 y.o. female with a diagnosis of symptomatic uterine fibroids. She does not desire future fertility and would like to proceed with definitive management. I reviewed with Lorna Thomas her medical records, physical exam, and review of symptoms. Despite the size of her uterus, I still think the procedure can be accomplished in a minimally invasive fashion.   I suggested a robotic approach, as I think I might have to perform multiple myomectomies at the end of the procedure to allow for transvaginal removal of the specimen. I suggested that we leave her ovaries, as long as they appear normal, to prevent the acute onset of menopause. She was counseled on the risks, benefits, indications and alternatives of the planned procedure. We discussed the possible surgical risks of bleeding, infection, potential injury to other organs/structures, and the risks of anesthesia. Her questions were answered to her satisfaction and she wishes to proceed as planned. An electronic signature was used to sign this note. Fredi Diaz MD  03/20/23              If you have questions, please do not hesitate to call me. I look forward to following Ms. Vicente along with you.         Sincerely,      Fredi Diaz MD

## 2023-03-21 RX ORDER — IBUPROFEN 200 MG
TABLET ORAL
COMMUNITY

## 2023-03-21 RX ORDER — LEVOTHYROXINE SODIUM 175 UG/1
175 TABLET ORAL
COMMUNITY

## 2023-03-21 NOTE — PERIOP NOTES
PAT PREOP PHONE INTERVIEW COMPLETED WITH:     PATIENT ADVISED NOT TO EAT/DRINK ANYTHING PAST MIDNIGHT EVENING PRIOR TO SURGERY,  NOTHING TO EAT/DRINK MORNING OF SURGERY UNLESS SIP OF WATER TO SWALLOW MEDICATION;  LEAVE ALL VALUABLES AT HOME; DO BRING PICTURE ID, INSURANCE CARD AND ANY COPAY; WEAR COMFORTABLE CLOTHING;  NO PERFUMES, POWDERS, LOTIONS; NO ALCOHOL 24 HOURS BEFORE OR AFTER SURGERY;  WILL NEED TO BE DRIVEN HOME BY FAMILY OR FRIEND;  AVOID TAKING NSAIDS, ASPIRIN, FISH OIL, VITAMIN E OR GLUCOSAMINE/CHONDROITIN DURING THIS TIME PRIOR TO SURGERY;  MAY TAKE TYLENOL. INSTRUCTED TO REPORT  [address] BY SURGEON'S OFFICE. INSTRUCTION PROVIDED FOR CHG SOAP.         INSTRUCTED TO COVID CARD ON DAY OF SURGERY

## 2023-03-21 NOTE — PERIOP NOTES
CALLED DR. LEYVA'S OFFICE TO NOTIFY OF ABNORMAL LAB RESULTS - HGB 7.5. HCT 26.8. Latest Reference Range & Units 3/20/23 12:31   MCV 79 - 97 fL 65 (L)   MCH 26.6 - 33.0 pg 19.2 (L)   MCHC 31.5 - 35.7 g/dL 29.5 (L)   RDW 11.7 - 15.4 % 16.8 (H)   PLATELET 282 - 701 O93Z1/ (H)   (L): Data is abnormally low  (H): Data is abnormally high. SPOKE W/MAMTA. ASKED IF DR. LEYVA'S WANT TO HAVE TYPE & SCREEN ORDERED STAT DOS FOR HGB OF 7.5.  DR. hWit Conklin REQUESTED TO HAVE TYPE & SCREEN ORDERED STAT DOS. RECEIVED A FAXED ORDER.

## 2023-03-21 NOTE — H&P
27 [facility] [address], [address]  P [phone]  F [phone]        Patient ID:  Name:  Cherly Kanner  MRN:  [id number]  :  1980/42 y.o. Date:  3/21/2023      HISTORY OF PRESENT ILLNESS:  Cherly Kanner is a 38 y.o.  premenopausal female who is a new patient being seen for uterine fibroids. She is referred by Dr. Jaspreet Gama with [facility]. She is symptomatic and anemic from the fibroids. Her last Hgb was 8.8. Options were discussed with her and she wished to proceed with hysterectomy. I have been asked to see her in consultation. ROS:   and GI review:  Negative  Cardiopulmonary review:  Negative   Musculoskeletal:  Negative    A comprehensive review of systems was negative except for that written in the History of Present Illness. , 10 point ROS      OB/GYN ROS/HX:          Problem List:  Patient Active Problem List    Diagnosis Date Noted    Advanced care planning/counseling discussion 2016    Uterine fibroid 2016    Hypothyroidism 2015     PMH:  Past Medical History:   Diagnosis Date    Chronic pain     Endometriosis     Hypothyroidism 2015    Dr. Escobedo [place]    Irregular menstruation     Melanoma of skin ([facility] [address] 2016    R distal thigh (Dr. Patria Escobar at [address] [facility]) - later pathology showed NOT melanoma    Uterine fibroid 2016      PSH:  Past Surgical History:   Procedure Laterality Date    HX HEENT      WISDOM TEETH EXTRACTION    HX ORTHOPAEDIC Right 2016    KNEE BIOPSY    HX OTHER SURGICAL Right 2015    dermatofibroma excision (upper R thigh), Dr. Kym Meredith    HX OTHER SURGICAL Right 2016    spitz nevus exc R knee (pathology revealed scar), Dr. Patria Escobar at [address] [facility] History:  Social History     Tobacco Use    Smoking status: Never    Smokeless tobacco: Never   Substance Use Topics    Alcohol use: Yes     Comment: 4 beers/wine a week      Family History:  Family History   Problem Relation Age of Onset    No Known Problems Mother     No Known Problems Father     No Known Problems Brother     Cancer Maternal Grandmother         ovarian    Cancer Maternal Grandfather         lung & liver CA    Cancer Paternal Grandmother         rectal CA    Anesth Problems Neg Hx       Medications: (reviewed)  No current facility-administered medications for this encounter. Current Outpatient Medications   Medication Sig    ibuprofen (MOTRIN) 200 mg tablet Take  by mouth every six (6) hours as needed for Pain.    levothyroxine (Unithroid) 175 mcg tablet Take 175 mcg by mouth Daily (before breakfast). ergocalciferol (ERGOCALCIFEROL) 1,250 mcg (50,000 unit) capsule TAKE 1 CAPSULE BY MOUTH WEEKLY FOR 90 DAYS    iron,carbonyl-vitamin C (Vitron-C) 65 mg iron- 125 mg TbEC Take  by mouth. Allergies: (reviewed)  Allergies   Allergen Reactions    Amoxicillin Other (comments)     Unknown; pt states she had a reaction as a baby. OBJECTIVE:    Physical Exam:  VITAL SIGNS: Vitals:    03/21/23 1001   Weight: 215 lb (97.5 kg)   Height: 5' 5\" (1.651 m)     Body mass index is 35.78 kg/m². GENERAL FAWN: Conversant, alert, oriented. No acute distress. HEENT: HEENT. No thyroid enlargement. No JVD. Neck: Supple without restrictions. RESPIRATORY: Clear to auscultation and percussion to the bases. No CVAT. CARDIOVASC: RRR without murmur/rub. GASTROINT: soft, non-tender, without masses or organomegaly   MUSCULOSKEL: no joint tenderness, deformity or swelling   EXTREMITIES: extremities normal, atraumatic, no cyanosis or edema   PELVIC: Normal external genitalia. Normal vagina and cervix. Uterus enlarged, with multiple fibroids, but somewhat mobile. Adnexa not palpable. RECTAL: Deferred   JAMARI SURVEY: No suspicious lymphadenopathy or edema noted. NEURO: Grossly intact. No acute deficit.        Lab Data:    Lab Results   Component Value Date/Time    WBC 8.8 03/20/2023 12:31 PM    HGB 7.9 (L) 03/20/2023 12:31 PM    HCT 26.8 (L) 03/20/2023 12:31 PM    PLATELET 655 (H) 34/34/7745 12:31 PM    MCV 65 (L) 03/20/2023 12:31 PM     Lab Results   Component Value Date/Time    Sodium 133 (L) 03/20/2023 12:31 PM    Potassium 4.6 03/20/2023 12:31 PM    Chloride 98 03/20/2023 12:31 PM    CO2 25 03/20/2023 12:31 PM    Glucose 98 03/20/2023 12:31 PM    BUN 10 03/20/2023 12:31 PM    Creatinine 0.58 03/20/2023 12:31 PM    BUN/Creatinine ratio 17 03/20/2023 12:31 PM    GFR est  07/19/2017 04:50 PM    GFR est non-AA 96 07/19/2017 04:50 PM    Calcium 9.4 03/20/2023 12:31 PM         Imaging: Outside ultrasound report from [facility] reviewed. Enlarged fibroid uterus measuring 9.3 x 7.5 x 11.2 cm. Multiple fibroids present, the largest measuring 7.4 cm in greatest dimension. Right ovary not seen. Left ovary mildly enlarged. No free fluid. IMPRESSION/PLAN:  Efraín Cabrera is a 38 y.o. female with a diagnosis of symptomatic uterine fibroids. She does not desire future fertility and would like to proceed with definitive management. I reviewed with Efraín Cabrera her medical records, physical exam, and review of symptoms. Despite the size of her uterus, I still think the procedure can be accomplished in a minimally invasive fashion. I suggested a robotic approach, as I think I might have to perform multiple myomectomies at the end of the procedure to allow for transvaginal removal of the specimen. I suggested that we leave her ovaries, as long as they appear normal, to prevent the acute onset of menopause. She was counseled on the risks, benefits, indications and alternatives of the planned procedure. We discussed the possible surgical risks of bleeding, infection, potential injury to other organs/structures, and the risks of anesthesia. Her questions were answered to her satisfaction and she wishes to proceed as planned.       An electronic signature was used to sign this note. Yan Leblanc MD  03/21/23       Date of Surgery Update  Michelle Montoya was seen and examined. History and physical has been reviewed. The patient has been examined. There have been no significant clinical changes since the completion of the originally dated History and Physical.  Patient identified by surgeon; surgical site was confirmed by patient and surgeon. Planned procedure again discussed. Questions invited and answered. Surgical consent signed by patient. Patient wishes to proceed with planned procedure.     Yan Leblanc MD  March 22, 2023  9:10 AM

## 2023-03-22 ENCOUNTER — HOSPITAL ENCOUNTER (OUTPATIENT)
Age: 43
Discharge: HOME | End: 2023-03-23
Attending: OBSTETRICS & GYNECOLOGY | Admitting: OBSTETRICS & GYNECOLOGY
Payer: COMMERCIAL

## 2023-03-22 ENCOUNTER — ANESTHESIA (OUTPATIENT)
Dept: SURGERY | Age: 43
End: 2023-03-22
Payer: COMMERCIAL

## 2023-03-22 ENCOUNTER — ANESTHESIA EVENT (OUTPATIENT)
Dept: SURGERY | Age: 43
End: 2023-03-22
Payer: COMMERCIAL

## 2023-03-22 DIAGNOSIS — G89.18 POST-OP PAIN: Primary | ICD-10-CM

## 2023-03-22 PROBLEM — D25.9 LEIOMYOMA OF UTERUS: Status: ACTIVE | Noted: 2023-03-22

## 2023-03-22 LAB
ABO + RH BLD: NORMAL
BLOOD GROUP ANTIBODIES SERPL: NORMAL
HCG UR QL: NEGATIVE
SPECIMEN EXP DATE BLD: NORMAL

## 2023-03-22 PROCEDURE — 2709999900 HC NON-CHARGEABLE SUPPLY: Performed by: OBSTETRICS & GYNECOLOGY

## 2023-03-22 PROCEDURE — 74011250636 HC RX REV CODE- 250/636

## 2023-03-22 PROCEDURE — 77030003666 HC NDL SPINAL BD -A: Performed by: OBSTETRICS & GYNECOLOGY

## 2023-03-22 PROCEDURE — 74011250636 HC RX REV CODE- 250/636: Performed by: STUDENT IN AN ORGANIZED HEALTH CARE EDUCATION/TRAINING PROGRAM

## 2023-03-22 PROCEDURE — 88307 TISSUE EXAM BY PATHOLOGIST: CPT

## 2023-03-22 PROCEDURE — 77030033162 HC PCH SPEC RTVR ENDOSC AMR -B: Performed by: OBSTETRICS & GYNECOLOGY

## 2023-03-22 PROCEDURE — 77030040922 HC BLNKT HYPOTHRM STRY -A

## 2023-03-22 PROCEDURE — 76060000036 HC ANESTHESIA 2.5 TO 3 HR: Performed by: OBSTETRICS & GYNECOLOGY

## 2023-03-22 PROCEDURE — 74011250637 HC RX REV CODE- 250/637: Performed by: STUDENT IN AN ORGANIZED HEALTH CARE EDUCATION/TRAINING PROGRAM

## 2023-03-22 PROCEDURE — 74011000250 HC RX REV CODE- 250: Performed by: NURSE ANESTHETIST, CERTIFIED REGISTERED

## 2023-03-22 PROCEDURE — 76210000016 HC OR PH I REC 1 TO 1.5 HR: Performed by: OBSTETRICS & GYNECOLOGY

## 2023-03-22 PROCEDURE — 74011250636 HC RX REV CODE- 250/636: Performed by: OBSTETRICS & GYNECOLOGY

## 2023-03-22 PROCEDURE — 76010000877 HC OR TIME 2.5 TO 3HR INTENSV - TIER 2: Performed by: OBSTETRICS & GYNECOLOGY

## 2023-03-22 PROCEDURE — 77030020703 HC SEAL CANN DISP INTU -B: Performed by: OBSTETRICS & GYNECOLOGY

## 2023-03-22 PROCEDURE — 74011250636 HC RX REV CODE- 250/636: Performed by: ANESTHESIOLOGY

## 2023-03-22 PROCEDURE — 86900 BLOOD TYPING SEROLOGIC ABO: CPT

## 2023-03-22 PROCEDURE — 81025 URINE PREGNANCY TEST: CPT

## 2023-03-22 PROCEDURE — 74011000250 HC RX REV CODE- 250: Performed by: OBSTETRICS & GYNECOLOGY

## 2023-03-22 PROCEDURE — 77030002934 HC SUT MCRYL J&J -B: Performed by: OBSTETRICS & GYNECOLOGY

## 2023-03-22 PROCEDURE — 74011250636 HC RX REV CODE- 250/636: Performed by: NURSE ANESTHETIST, CERTIFIED REGISTERED

## 2023-03-22 PROCEDURE — 77030007955 HC PCH ENDOSC SPEC J&J -B: Performed by: OBSTETRICS & GYNECOLOGY

## 2023-03-22 PROCEDURE — 36415 COLL VENOUS BLD VENIPUNCTURE: CPT

## 2023-03-22 PROCEDURE — 77030008684 HC TU ET CUF COVD -B: Performed by: ANESTHESIOLOGY

## 2023-03-22 PROCEDURE — 74011250637 HC RX REV CODE- 250/637: Performed by: OBSTETRICS & GYNECOLOGY

## 2023-03-22 PROCEDURE — 77030031139 HC SUT VCRL2 J&J -A: Performed by: OBSTETRICS & GYNECOLOGY

## 2023-03-22 PROCEDURE — 77030013079 HC BLNKT BAIR HGGR 3M -A: Performed by: ANESTHESIOLOGY

## 2023-03-22 PROCEDURE — 77030031492 HC PRT ACC BLNT AIRSEAL CNMD -B: Performed by: OBSTETRICS & GYNECOLOGY

## 2023-03-22 PROCEDURE — 77030002933 HC SUT MCRYL J&J -A: Performed by: OBSTETRICS & GYNECOLOGY

## 2023-03-22 PROCEDURE — 77030018778 HC MANIP UTER VCAR CNMD -B: Performed by: OBSTETRICS & GYNECOLOGY

## 2023-03-22 PROCEDURE — 77030035277 HC OBTRTR BLDELSS DISP INTU -B: Performed by: OBSTETRICS & GYNECOLOGY

## 2023-03-22 PROCEDURE — 74011250637 HC RX REV CODE- 250/637: Performed by: PHYSICIAN ASSISTANT

## 2023-03-22 PROCEDURE — 77030010031 HC SCIS ENDOSC MPLR J&J -C: Performed by: OBSTETRICS & GYNECOLOGY

## 2023-03-22 PROCEDURE — 77030035029 HC NDL INSUF VERES DISP COVD -B: Performed by: OBSTETRICS & GYNECOLOGY

## 2023-03-22 PROCEDURE — 88305 TISSUE EXAM BY PATHOLOGIST: CPT

## 2023-03-22 PROCEDURE — 77030026438 HC STYL ET INTUB CARD -A: Performed by: ANESTHESIOLOGY

## 2023-03-22 RX ORDER — SODIUM CHLORIDE 0.9 % (FLUSH) 0.9 %
5-40 SYRINGE (ML) INJECTION EVERY 8 HOURS
Status: DISCONTINUED | OUTPATIENT
Start: 2023-03-22 | End: 2023-03-23 | Stop reason: HOSPADM

## 2023-03-22 RX ORDER — KETOROLAC TROMETHAMINE 30 MG/ML
30 INJECTION, SOLUTION INTRAMUSCULAR; INTRAVENOUS EVERY 6 HOURS
Status: DISCONTINUED | OUTPATIENT
Start: 2023-03-22 | End: 2023-03-23 | Stop reason: HOSPADM

## 2023-03-22 RX ORDER — SODIUM CHLORIDE 0.9 % (FLUSH) 0.9 %
5-40 SYRINGE (ML) INJECTION AS NEEDED
Status: DISCONTINUED | OUTPATIENT
Start: 2023-03-22 | End: 2023-03-22 | Stop reason: HOSPADM

## 2023-03-22 RX ORDER — NEOSTIGMINE METHYLSULFATE 1 MG/ML
INJECTION, SOLUTION INTRAVENOUS AS NEEDED
Status: DISCONTINUED | OUTPATIENT
Start: 2023-03-22 | End: 2023-03-22 | Stop reason: HOSPADM

## 2023-03-22 RX ORDER — ROCURONIUM BROMIDE 10 MG/ML
INJECTION, SOLUTION INTRAVENOUS AS NEEDED
Status: DISCONTINUED | OUTPATIENT
Start: 2023-03-22 | End: 2023-03-22 | Stop reason: HOSPADM

## 2023-03-22 RX ORDER — HYDROMORPHONE HYDROCHLORIDE 2 MG/ML
INJECTION, SOLUTION INTRAMUSCULAR; INTRAVENOUS; SUBCUTANEOUS AS NEEDED
Status: DISCONTINUED | OUTPATIENT
Start: 2023-03-22 | End: 2023-03-22 | Stop reason: HOSPADM

## 2023-03-22 RX ORDER — LIDOCAINE HYDROCHLORIDE 10 MG/ML
0.1 INJECTION, SOLUTION EPIDURAL; INFILTRATION; INTRACAUDAL; PERINEURAL AS NEEDED
Status: DISCONTINUED | OUTPATIENT
Start: 2023-03-22 | End: 2023-03-22 | Stop reason: HOSPADM

## 2023-03-22 RX ORDER — DIPHENHYDRAMINE HYDROCHLORIDE 50 MG/ML
12.5 INJECTION, SOLUTION INTRAMUSCULAR; INTRAVENOUS
Status: DISCONTINUED | OUTPATIENT
Start: 2023-03-22 | End: 2023-03-23 | Stop reason: HOSPADM

## 2023-03-22 RX ORDER — ONDANSETRON 2 MG/ML
4 INJECTION INTRAMUSCULAR; INTRAVENOUS AS NEEDED
Status: DISCONTINUED | OUTPATIENT
Start: 2023-03-22 | End: 2023-03-22 | Stop reason: HOSPADM

## 2023-03-22 RX ORDER — MIDAZOLAM HYDROCHLORIDE 1 MG/ML
INJECTION, SOLUTION INTRAMUSCULAR; INTRAVENOUS AS NEEDED
Status: DISCONTINUED | OUTPATIENT
Start: 2023-03-22 | End: 2023-03-22 | Stop reason: HOSPADM

## 2023-03-22 RX ORDER — MIDAZOLAM HYDROCHLORIDE 1 MG/ML
INJECTION, SOLUTION INTRAMUSCULAR; INTRAVENOUS
Status: DISPENSED
Start: 2023-03-22 | End: 2023-03-23

## 2023-03-22 RX ORDER — SODIUM CHLORIDE 0.9 % (FLUSH) 0.9 %
5-40 SYRINGE (ML) INJECTION AS NEEDED
Status: DISCONTINUED | OUTPATIENT
Start: 2023-03-22 | End: 2023-03-23 | Stop reason: HOSPADM

## 2023-03-22 RX ORDER — GLYCOPYRROLATE 0.2 MG/ML
INJECTION INTRAMUSCULAR; INTRAVENOUS AS NEEDED
Status: DISCONTINUED | OUTPATIENT
Start: 2023-03-22 | End: 2023-03-22 | Stop reason: HOSPADM

## 2023-03-22 RX ORDER — ACETAMINOPHEN 325 MG/1
650 TABLET ORAL EVERY 6 HOURS
Status: DISCONTINUED | OUTPATIENT
Start: 2023-03-22 | End: 2023-03-23 | Stop reason: HOSPADM

## 2023-03-22 RX ORDER — FENTANYL CITRATE 50 UG/ML
INJECTION, SOLUTION INTRAMUSCULAR; INTRAVENOUS
Status: COMPLETED
Start: 2023-03-22 | End: 2023-03-22

## 2023-03-22 RX ORDER — HYDROMORPHONE HYDROCHLORIDE 1 MG/ML
1 INJECTION, SOLUTION INTRAMUSCULAR; INTRAVENOUS; SUBCUTANEOUS
Status: DISCONTINUED | OUTPATIENT
Start: 2023-03-22 | End: 2023-03-23 | Stop reason: HOSPADM

## 2023-03-22 RX ORDER — ONDANSETRON 2 MG/ML
INJECTION INTRAMUSCULAR; INTRAVENOUS AS NEEDED
Status: DISCONTINUED | OUTPATIENT
Start: 2023-03-22 | End: 2023-03-22 | Stop reason: HOSPADM

## 2023-03-22 RX ORDER — ACETAMINOPHEN 325 MG/1
650 TABLET ORAL ONCE
Status: COMPLETED | OUTPATIENT
Start: 2023-03-22 | End: 2023-03-22

## 2023-03-22 RX ORDER — LORAZEPAM 2 MG/ML
1 INJECTION, SOLUTION INTRAMUSCULAR; INTRAVENOUS
Status: DISCONTINUED | OUTPATIENT
Start: 2023-03-22 | End: 2023-03-23 | Stop reason: HOSPADM

## 2023-03-22 RX ORDER — PROPOFOL 10 MG/ML
INJECTION, EMULSION INTRAVENOUS AS NEEDED
Status: DISCONTINUED | OUTPATIENT
Start: 2023-03-22 | End: 2023-03-22 | Stop reason: HOSPADM

## 2023-03-22 RX ORDER — OXYCODONE HYDROCHLORIDE 5 MG/1
5 TABLET ORAL
Status: DISCONTINUED | OUTPATIENT
Start: 2023-03-22 | End: 2023-03-23 | Stop reason: HOSPADM

## 2023-03-22 RX ORDER — ONDANSETRON 2 MG/ML
4 INJECTION INTRAMUSCULAR; INTRAVENOUS
Status: DISCONTINUED | OUTPATIENT
Start: 2023-03-22 | End: 2023-03-23 | Stop reason: HOSPADM

## 2023-03-22 RX ORDER — BUPIVACAINE HYDROCHLORIDE 5 MG/ML
INJECTION, SOLUTION EPIDURAL; INTRACAUDAL AS NEEDED
Status: DISCONTINUED | OUTPATIENT
Start: 2023-03-22 | End: 2023-03-22 | Stop reason: HOSPADM

## 2023-03-22 RX ORDER — LIDOCAINE HYDROCHLORIDE 20 MG/ML
INJECTION, SOLUTION EPIDURAL; INFILTRATION; INTRACAUDAL; PERINEURAL AS NEEDED
Status: DISCONTINUED | OUTPATIENT
Start: 2023-03-22 | End: 2023-03-22 | Stop reason: HOSPADM

## 2023-03-22 RX ORDER — DOCUSATE SODIUM 100 MG/1
100 CAPSULE, LIQUID FILLED ORAL
Status: DISCONTINUED | OUTPATIENT
Start: 2023-03-22 | End: 2023-03-23 | Stop reason: HOSPADM

## 2023-03-22 RX ORDER — SODIUM CHLORIDE, SODIUM LACTATE, POTASSIUM CHLORIDE, CALCIUM CHLORIDE 600; 310; 30; 20 MG/100ML; MG/100ML; MG/100ML; MG/100ML
50 INJECTION, SOLUTION INTRAVENOUS CONTINUOUS
Status: DISCONTINUED | OUTPATIENT
Start: 2023-03-22 | End: 2023-03-22 | Stop reason: HOSPADM

## 2023-03-22 RX ORDER — SODIUM CHLORIDE 9 MG/ML
125 INJECTION, SOLUTION INTRAVENOUS CONTINUOUS
Status: DISCONTINUED | OUTPATIENT
Start: 2023-03-22 | End: 2023-03-23 | Stop reason: HOSPADM

## 2023-03-22 RX ORDER — HYDROMORPHONE HYDROCHLORIDE 1 MG/ML
0.2 INJECTION, SOLUTION INTRAMUSCULAR; INTRAVENOUS; SUBCUTANEOUS
Status: DISCONTINUED | OUTPATIENT
Start: 2023-03-22 | End: 2023-03-22 | Stop reason: HOSPADM

## 2023-03-22 RX ORDER — FENTANYL CITRATE 50 UG/ML
INJECTION, SOLUTION INTRAMUSCULAR; INTRAVENOUS AS NEEDED
Status: DISCONTINUED | OUTPATIENT
Start: 2023-03-22 | End: 2023-03-22 | Stop reason: HOSPADM

## 2023-03-22 RX ORDER — PROCHLORPERAZINE EDISYLATE 5 MG/ML
10 INJECTION INTRAMUSCULAR; INTRAVENOUS
Status: DISCONTINUED | OUTPATIENT
Start: 2023-03-22 | End: 2023-03-23 | Stop reason: HOSPADM

## 2023-03-22 RX ORDER — FENTANYL CITRATE 50 UG/ML
25 INJECTION, SOLUTION INTRAMUSCULAR; INTRAVENOUS
Status: COMPLETED | OUTPATIENT
Start: 2023-03-22 | End: 2023-03-22

## 2023-03-22 RX ORDER — SUCCINYLCHOLINE CHLORIDE 20 MG/ML
INJECTION INTRAMUSCULAR; INTRAVENOUS AS NEEDED
Status: DISCONTINUED | OUTPATIENT
Start: 2023-03-22 | End: 2023-03-22 | Stop reason: HOSPADM

## 2023-03-22 RX ORDER — MIDAZOLAM HYDROCHLORIDE 1 MG/ML
2 INJECTION, SOLUTION INTRAMUSCULAR; INTRAVENOUS ONCE
Status: COMPLETED | OUTPATIENT
Start: 2023-03-22 | End: 2023-03-22

## 2023-03-22 RX ORDER — KETAMINE HCL IN 0.9 % NACL 50 MG/5 ML
SYRINGE (ML) INTRAVENOUS AS NEEDED
Status: DISCONTINUED | OUTPATIENT
Start: 2023-03-22 | End: 2023-03-22 | Stop reason: HOSPADM

## 2023-03-22 RX ORDER — DEXAMETHASONE SODIUM PHOSPHATE 4 MG/ML
INJECTION, SOLUTION INTRA-ARTICULAR; INTRALESIONAL; INTRAMUSCULAR; INTRAVENOUS; SOFT TISSUE AS NEEDED
Status: DISCONTINUED | OUTPATIENT
Start: 2023-03-22 | End: 2023-03-22 | Stop reason: HOSPADM

## 2023-03-22 RX ORDER — TRAMADOL HYDROCHLORIDE 50 MG/1
50 TABLET ORAL
Status: DISCONTINUED | OUTPATIENT
Start: 2023-03-22 | End: 2023-03-23 | Stop reason: HOSPADM

## 2023-03-22 RX ORDER — SODIUM CHLORIDE 0.9 % (FLUSH) 0.9 %
5-40 SYRINGE (ML) INJECTION EVERY 8 HOURS
Status: DISCONTINUED | OUTPATIENT
Start: 2023-03-22 | End: 2023-03-22 | Stop reason: HOSPADM

## 2023-03-22 RX ADMIN — SODIUM CHLORIDE, POTASSIUM CHLORIDE, SODIUM LACTATE AND CALCIUM CHLORIDE: 600; 310; 30; 20 INJECTION, SOLUTION INTRAVENOUS at 11:45

## 2023-03-22 RX ADMIN — FENTANYL CITRATE 25 MCG: 50 INJECTION, SOLUTION INTRAMUSCULAR; INTRAVENOUS at 12:45

## 2023-03-22 RX ADMIN — LORAZEPAM 1 MG: 2 INJECTION, SOLUTION INTRAMUSCULAR; INTRAVENOUS at 13:19

## 2023-03-22 RX ADMIN — ACETAMINOPHEN 650 MG: 325 TABLET ORAL at 09:25

## 2023-03-22 RX ADMIN — ROCURONIUM BROMIDE 35 MG: 10 SOLUTION INTRAVENOUS at 10:13

## 2023-03-22 RX ADMIN — PROPOFOL 150 MG: 10 INJECTION, EMULSION INTRAVENOUS at 10:08

## 2023-03-22 RX ADMIN — HYDROMORPHONE HYDROCHLORIDE 0.2 MG: 1 INJECTION, SOLUTION INTRAMUSCULAR; INTRAVENOUS; SUBCUTANEOUS at 13:45

## 2023-03-22 RX ADMIN — GLYCOPYRROLATE 0.4 MG: 0.2 INJECTION INTRAMUSCULAR; INTRAVENOUS at 11:44

## 2023-03-22 RX ADMIN — Medication 1 LOZENGE: at 18:40

## 2023-03-22 RX ADMIN — SODIUM CHLORIDE, POTASSIUM CHLORIDE, SODIUM LACTATE AND CALCIUM CHLORIDE 50 ML/HR: 600; 310; 30; 20 INJECTION, SOLUTION INTRAVENOUS at 09:22

## 2023-03-22 RX ADMIN — HYDROMORPHONE HYDROCHLORIDE 0.2 MG: 1 INJECTION, SOLUTION INTRAMUSCULAR; INTRAVENOUS; SUBCUTANEOUS at 13:00

## 2023-03-22 RX ADMIN — SODIUM CHLORIDE, PRESERVATIVE FREE 10 ML: 5 INJECTION INTRAVENOUS at 14:50

## 2023-03-22 RX ADMIN — ROCURONIUM BROMIDE 5 MG: 10 SOLUTION INTRAVENOUS at 10:08

## 2023-03-22 RX ADMIN — LIDOCAINE HYDROCHLORIDE 60 MG: 20 INJECTION, SOLUTION EPIDURAL; INFILTRATION; INTRACAUDAL; PERINEURAL at 10:08

## 2023-03-22 RX ADMIN — FENTANYL CITRATE 25 MCG: 50 INJECTION, SOLUTION INTRAMUSCULAR; INTRAVENOUS at 13:00

## 2023-03-22 RX ADMIN — CEFOXITIN SODIUM 2 G: 2 POWDER, FOR SOLUTION INTRAVENOUS at 12:20

## 2023-03-22 RX ADMIN — KETOROLAC TROMETHAMINE 30 MG: 30 INJECTION, SOLUTION INTRAMUSCULAR; INTRAVENOUS at 17:00

## 2023-03-22 RX ADMIN — PROCHLORPERAZINE EDISYLATE 10 MG: 5 INJECTION INTRAMUSCULAR; INTRAVENOUS at 17:00

## 2023-03-22 RX ADMIN — NEOSTIGMINE METHYLSULFATE 3 MG: 1 INJECTION, SOLUTION INTRAVENOUS at 11:44

## 2023-03-22 RX ADMIN — FENTANYL CITRATE 50 MCG: 0.05 INJECTION, SOLUTION INTRAMUSCULAR; INTRAVENOUS at 12:29

## 2023-03-22 RX ADMIN — FENTANYL CITRATE 50 MCG: 0.05 INJECTION, SOLUTION INTRAMUSCULAR; INTRAVENOUS at 12:32

## 2023-03-22 RX ADMIN — ONDANSETRON HYDROCHLORIDE 4 MG: 2 INJECTION, SOLUTION INTRAMUSCULAR; INTRAVENOUS at 11:47

## 2023-03-22 RX ADMIN — FENTANYL CITRATE 25 MCG: 50 INJECTION, SOLUTION INTRAMUSCULAR; INTRAVENOUS at 12:50

## 2023-03-22 RX ADMIN — HYDROMORPHONE HYDROCHLORIDE 0.5 MG: 2 INJECTION, SOLUTION INTRAMUSCULAR; INTRAVENOUS; SUBCUTANEOUS at 11:58

## 2023-03-22 RX ADMIN — FENTANYL CITRATE 50 MCG: 0.05 INJECTION, SOLUTION INTRAMUSCULAR; INTRAVENOUS at 11:16

## 2023-03-22 RX ADMIN — ACETAMINOPHEN 650 MG: 325 TABLET ORAL at 17:00

## 2023-03-22 RX ADMIN — PROPOFOL 50 MG: 10 INJECTION, EMULSION INTRAVENOUS at 10:26

## 2023-03-22 RX ADMIN — MIDAZOLAM 2 MG: 1 INJECTION INTRAMUSCULAR; INTRAVENOUS at 09:53

## 2023-03-22 RX ADMIN — HYDROMORPHONE HYDROCHLORIDE 0.2 MG: 1 INJECTION, SOLUTION INTRAMUSCULAR; INTRAVENOUS; SUBCUTANEOUS at 12:45

## 2023-03-22 RX ADMIN — HYDROMORPHONE HYDROCHLORIDE 0.2 MG: 1 INJECTION, SOLUTION INTRAMUSCULAR; INTRAVENOUS; SUBCUTANEOUS at 13:15

## 2023-03-22 RX ADMIN — CEFOXITIN SODIUM 2 G: 2 POWDER, FOR SOLUTION INTRAVENOUS at 10:17

## 2023-03-22 RX ADMIN — FENTANYL CITRATE 50 MCG: 0.05 INJECTION, SOLUTION INTRAMUSCULAR; INTRAVENOUS at 10:08

## 2023-03-22 RX ADMIN — HYDROMORPHONE HYDROCHLORIDE 0.2 MG: 1 INJECTION, SOLUTION INTRAMUSCULAR; INTRAVENOUS; SUBCUTANEOUS at 13:30

## 2023-03-22 RX ADMIN — ROCURONIUM BROMIDE 10 MG: 10 SOLUTION INTRAVENOUS at 11:01

## 2023-03-22 RX ADMIN — SODIUM CHLORIDE 125 ML/HR: 9 INJECTION, SOLUTION INTRAVENOUS at 17:01

## 2023-03-22 RX ADMIN — FENTANYL CITRATE 25 MCG: 50 INJECTION, SOLUTION INTRAMUSCULAR; INTRAVENOUS at 12:55

## 2023-03-22 RX ADMIN — HYDROMORPHONE HYDROCHLORIDE 0.5 MG: 2 INJECTION, SOLUTION INTRAMUSCULAR; INTRAVENOUS; SUBCUTANEOUS at 11:53

## 2023-03-22 RX ADMIN — OXYCODONE HYDROCHLORIDE 5 MG: 5 TABLET ORAL at 20:38

## 2023-03-22 RX ADMIN — FENTANYL CITRATE 50 MCG: 0.05 INJECTION, SOLUTION INTRAMUSCULAR; INTRAVENOUS at 10:26

## 2023-03-22 RX ADMIN — Medication 30 MG: at 10:25

## 2023-03-22 RX ADMIN — DEXAMETHASONE SODIUM PHOSPHATE 4 MG: 4 INJECTION, SOLUTION INTRAMUSCULAR; INTRAVENOUS at 10:19

## 2023-03-22 RX ADMIN — SUCCINYLCHOLINE CHLORIDE 160 MG: 20 INJECTION, SOLUTION INTRAMUSCULAR; INTRAVENOUS at 10:09

## 2023-03-22 RX ADMIN — ONDANSETRON 4 MG: 2 INJECTION INTRAMUSCULAR; INTRAVENOUS at 13:26

## 2023-03-22 RX ADMIN — MIDAZOLAM 2 MG: 1 INJECTION INTRAMUSCULAR; INTRAVENOUS at 12:52

## 2023-03-22 NOTE — BRIEF OP NOTE
Brief Postoperative Note    Patient: Samara Morel  YOB: 1980  MRN: [id number]    Date of Procedure: 3/22/2023     Pre-Op Diagnosis: UTERINE FIBRIODS    Post-Op Diagnosis: UTERINE FIBROIDS, ENDOMETRIOSIS      Procedure(s):  ROBOTIC ASSISTED TOTAL LAPAROSCOPIC HYSTERECTOMY, BILATERAL SALPINGECTOMY, LEFT OOPHORECTOMY    Surgeon(s):  Liban Gil MD    Surgical Assistant: Physician Assistant: Daisy Vazquez PA-C    Anesthesia: General     Estimated Blood Loss (mL): less than 50     Complications: None    Specimens:   ID Type Source Tests Collected by Time Destination   1 : uterus, cervix, bilateral fallopian tubes Fresh Uterus with Bilateral Fallopian Tubes  Liban Gil MD 3/22/2023 1119 Pathology   2 : left ovary Fresh Ovary  Liban Gil MD 3/22/2023 1131 Pathology        Implants: * No implants in log *    Drains: * No LDAs found *    Findings: Enlarged fibroid uterus with a dominant posterior fibroid representing most of the mass of the uterus. One large anterior fibroid, as well as a large intracavitary fibroid. Normal tubes bilaterally. Left ovary with a large endometrioma. Normal right ovary. Normal cervix. Old blood noted in the pelvis and peritoneal cavity, consistent with retrograde menstruation.       Electronically Signed by Westley Samuels MD on 3/22/2023 at 11:59 AM

## 2023-03-22 NOTE — PROGRESS NOTES
[place] [facility] Observation Notice (Tegan Holt) provided to patient/representative with verbal explanation of the notice. Time allotted for questions regarding the notice. Patient /representative provided a completed copy of the VOON notice. Copy placed on bedside chart.

## 2023-03-22 NOTE — ANESTHESIA PREPROCEDURE EVALUATION
Relevant Problems   No relevant active problems       Anesthetic History   No history of anesthetic complications            Review of Systems / Medical History  Patient summary reviewed, nursing notes reviewed and pertinent labs reviewed    Pulmonary  Within defined limits                 Neuro/Psych   Within defined limits           Cardiovascular  Within defined limits                Exercise tolerance: >4 METS     GI/Hepatic/Renal  Within defined limits              Endo/Other      Hypothyroidism  Anemia     Other Findings            Physical Exam    Airway  Mallampati: II  TM Distance: 4 - 6 cm  Neck ROM: normal range of motion   Mouth opening: Normal     Cardiovascular    Rhythm: regular  Rate: normal         Dental  No notable dental hx       Pulmonary  Breath sounds clear to auscultation               Abdominal  GI exam deferred       Other Findings            Anesthetic Plan    ASA: 2  Anesthesia type: general          Induction: Intravenous  Anesthetic plan and risks discussed with: Patient

## 2023-03-22 NOTE — PROGRESS NOTES
1400: TRANSFER - IN REPORT:    Verbal report received from EDNNISRN(name) on [place]  being received from PACU(unit) for routine post - op      Report consisted of patients Situation, Background, Assessment and   Recommendations(SBAR). Information from the following report(s) SBAR, OR Summary, Intake/Output, MAR, Accordion, Recent Results, and Med Rec Status was reviewed with the receiving nurse. Opportunity for questions and clarification was provided. Assessment completed upon patients arrival to unit and care assumed.

## 2023-03-22 NOTE — ANESTHESIA POSTPROCEDURE EVALUATION
Procedure(s):  ROBOTIC ASSISTED TOTAL LAPAROSCOPIC HYSTERECTOMY, LEFT OOPHORECTOMY. general    Anesthesia Post Evaluation        Patient participation: complete - patient participated  Level of consciousness: awake  Pain management: adequate  Airway patency: patent  Anesthetic complications: no  Cardiovascular status: hemodynamically stable  Respiratory status: acceptable  Hydration status: acceptable  Comments: The patient is ready for PACU discharge. Ashley Chisholm DO                   Post anesthesia nausea and vomiting:  controlled      INITIAL Post-op Vital signs:   Vitals Value Taken Time   /62 03/22/23 1330   Temp 36.6 °C (97.8 °F) 03/22/23 1240   Pulse 89 03/22/23 1342   Resp 15 03/22/23 1342   SpO2 96 % 03/22/23 1342   Vitals shown include unvalidated device data.

## 2023-03-22 NOTE — OP NOTES
Gynecologic Oncology Operative Report    August Keenan  3/22/2023    Pre-operative dx:  Uterine leiomyoma     Post-operative dx:  Uterine leiomyoma, endometriosis of left ovary     Procedure: Robotic-assisted total laparoscopic hysterectomy, bilateral salpingectomy, left oophorectomy      Surgeon:  Dorita Murphy MD     Assistant:  Lux Kaur PA-C (Assistance was required due to the difficulty of the procedure. They actively assisted with the necessary dissection and proper identification of relevant anatomy throughout the course of the procedure.)     Anesthesia:  GETA     EBL:  <28 cc     Complications:  None    Implants:  None     Specimens:    ID Type Source Tests Collected by Time Destination   1 : uterus, cervix, bilateral fallopian tubes Fresh Uterus with Bilateral Fallopian Tubes  Kelly Castaneda MD 3/22/2023 1119 Pathology   2 : left ovary Fresh Ovary  Kelly Castaneda MD 3/22/2023 1131 Pathology     Operative indications:  39 yo WF with a large symptomatic fibroid uterus, with menorrhagia and resulting anemia. She also reports a history of endometriosis in her left ovary. Operative findings:  Enlarged fibroid uterus with a dominant posterior fibroid representing most of the mass of the uterus. One large anterior fibroid, as well as a large intracavitary fibroid. Normal tubes bilaterally. Left ovary with a large endometrioma, containing \"chocolate cyst\" fluid. Normal right ovary. Normal cervix. Old blood noted in the pelvis and peritoneal cavity, consistent with retrograde menstruation. Procedure in detail: After the risks, benefits, indications, and alternatives of the procedure were discussed with the patient and informed consent was obtained, the patient was taken to the operating room. She was positively identified, administered general anesthesia, and then placed in the dorsal lithotomy position in [address] [place] [address]. An exam under anesthesia was performed.  She was then prepped and draped in the usual fashion. A serrano catheter was inserted. An open-sided speculum was used to visualize the cervix. The cervix was grasped with a single-tooth tenaculum and then progressively dilated using cervical dilators. I then placed a GenJuice-Boxbe uterine manipulator. We then proceeded with laparoscopy. A horizontal incision was made in the midline above the level of the umbilicus. A Veres needle was inserted into the peritoneal cavity and the placement was confirmed. The abdomen was then insufflated to a pressure of 15 mm Hg. An 8 mm da Estrella trocar was then inserted at this site. The camera was then inserted to confirm proper placement. Three additional 8 mm da Estrella trocars were then placed under direct visualization, two on the right, and one on the left. These were placed approximately 8 cm apart in across the upper abdomen. An 8 mm Airseal assistant port was then placed in the left abdomen, centered above and between the midline and the lateral da Estrella trocars. Positioning of the trocars in the abdominal wall were then adjusted to locate the point of articulation at the level of the fascia. The patient was then placed in steep Trendelenberg position. The [employer] robot was then moved to the bedside and targeted over the patient. The arms were moved into position. The #2 port was then docked and the camera was inserted and advanced. The three remaining trocars were then docked with the [employer]. A fenestrated bipolar grasper was placed in arm #1, monopolar scissors placed in arm #3, and a Prograsp placed in arm #4. I then removed my gown and made my way to the console. The abdomen and pelvis were then examined, with the above mentioned findings noted. Pelvic washings were obtained as well. We then proceeded with the hysterectomy. The left round ligament was identified, then coagulated and transected with the cautery, allowing entry into the retroperitoneal space.  The vesico-uterine peritoneum was divided with cauteryl from the left side across the midline, allowing visualization of the ring of the V-Care manipulator anteriorly. We identified the left ureter and bluntly developed the perirectal space. The left uterine artery was identified at its origin off of the hypogastric artery, and it was coagulated with bipolar cautery at that point, with the ureter being held on traction medially to ensure its safety. The left ovary was noted to have a large endometriotic cyst adherent to the posterior uterus. It was freed with cautery, but during the dissection the cyst was ruptured, revealing a chocolate cyst.  We then divided the mesosalpinx and  the left ovary from the uterine cornua. It was left lying along the pelvic sidewall, with the plan being to address the endometrioma later. The posterior leaf of the broad ligament was then divided with the Harmonic Scalpel up to the level of the uterine body    We then directed our attention to the right side of the pelvis. The right round ligament was identified and then coagulated and transected with cautery, allowing entry into the retroperitoneal space. The remaining vesico-uterine peritoneum was then divided with on the right side. The right ureter was then identified and the perirectal space was bluntly developed. The right uterine artery was then identified at its origin off of the hypogastric artery. It was coagulated with bipolar cautery at that point, with the ureter being held on traction medially to ensure its safety. The right fallopian tube was then elevated and the mesosalpinx was divided with cautery. The ovary was then  from the uterine cornua. The posterior leaf of the broad ligament was then divided with the Harmonic Scalpel up to the level of the uterine body.      We then moved anteriorly and the bladder was sharply dissected off of the lower uterine segment and cervix until it was well below the ring of the V-Care manipulator. The uterine arteries were then skeletonized bilaterally and then coagulated with bipolar cautery and transected with monopolar cautery at the level of the V-Care ring. A circumferential colpotomy was then performed by using the monopolar scissors to cut down onto the V-Care ring. Once the colpotomy was completed, the specimen was delivered transvaginally and sent to pathology. In order to do this, I performed a post-hysterectomy myomectomy, freeing the two largest fibroids. They were then placed in separate Endocatch bags, as was the uterus, and delivered transvaginally. A bulb syringe was then placed into the vagina to maintain pneumoperitoneum. I then evaluated the left ovary and began dissecting the endometrioma away. Seeing as it appeared to involve a large portion of the left ovary, and the right ovary appeared normal, I opted to proceed with left oophorectomy, rather than leaving residual endometriosis. The ovarian vessels were isolated and then they were coagulated and transected. The specimen was then passed transvaginally and sent to pathology along with the uterus. The bulb syringe was then placed back into the vagina to maintain pneumoperitoneum for vaginal cuff closure. The scissors were then removed from arm #3 and replaced with a Megacut needle . The cuff was then reapproximated with a running closure using a 2-0 Stratafix suture. Excellent reapproximation and hemostasis was noted. The pelvis was then irrigated and all pedicles inspected. Once satisfied with hemostasis, the gas was then allowed to escape from the abdomen and the trochars were removed. She was then taken out of Trendelenburg tilt. The incision sites were closed with a stitch of 4-0 Monocryl, followed by a layer of Dermabond. The bulb syringe was then removed from the vagina.  The patient was taken out of stirrups, awakened from anesthesia, and taken to the recovery room in stable condition. All instrument, sponge, and needle counts were correct.      Westley Samuels MD  3/22/2023  12:05 PM

## 2023-03-22 NOTE — PROGRESS NOTES
Bedside and Verbal shift change report given to NICHOLAS Oneill RN (oncoming nurse) by Yuli Villa RN (offgoing nurse). Report included the following information SBAR, Kardex, and OR Summary.      2051 MD made aware of Hgb 6.6, Visit Vitals  /64 (BP 1 Location: Right upper arm, BP Patient Position: At rest)   Pulse 89   Temp 98.7 °F (37.1 °C)   Resp 16   Ht 5' 5\" (1.651 m)   Wt 97.5 kg (214 lb 15.2 oz)   SpO2 99%   BMI 35.77 kg/m²

## 2023-03-23 VITALS
RESPIRATION RATE: 16 BRPM | BODY MASS INDEX: 35.81 KG/M2 | TEMPERATURE: 97.9 F | OXYGEN SATURATION: 95 % | WEIGHT: 214.95 LBS | HEART RATE: 87 BPM | HEIGHT: 65 IN | SYSTOLIC BLOOD PRESSURE: 130 MMHG | DIASTOLIC BLOOD PRESSURE: 75 MMHG

## 2023-03-23 LAB
ANION GAP SERPL CALC-SCNC: 3 MMOL/L (ref 5–15)
BUN SERPL-MCNC: 10 MG/DL (ref 6–20)
BUN/CREAT SERPL: 19 (ref 12–20)
CALCIUM SERPL-MCNC: 8 MG/DL (ref 8.5–10.1)
CHLORIDE SERPL-SCNC: 111 MMOL/L (ref 97–108)
CO2 SERPL-SCNC: 25 MMOL/L (ref 21–32)
CREAT SERPL-MCNC: 0.53 MG/DL (ref 0.55–1.02)
ERYTHROCYTE [DISTWIDTH] IN BLOOD BY AUTOMATED COUNT: 16.5 % (ref 11.5–14.5)
GLUCOSE SERPL-MCNC: 110 MG/DL (ref 65–100)
HCT VFR BLD AUTO: 22.7 % (ref 35–47)
HGB BLD-MCNC: 6.6 G/DL (ref 11.5–16)
MCH RBC QN AUTO: 19.2 PG (ref 26–34)
MCHC RBC AUTO-ENTMCNC: 29.1 G/DL (ref 30–36.5)
MCV RBC AUTO: 66.2 FL (ref 80–99)
NRBC # BLD: 0 K/UL (ref 0–0.01)
NRBC BLD-RTO: 0 PER 100 WBC
PLATELET # BLD AUTO: 445 K/UL (ref 150–400)
PMV BLD AUTO: 8.7 FL (ref 8.9–12.9)
POTASSIUM SERPL-SCNC: 3.7 MMOL/L (ref 3.5–5.1)
RBC # BLD AUTO: 3.43 M/UL (ref 3.8–5.2)
SODIUM SERPL-SCNC: 139 MMOL/L (ref 136–145)
WBC # BLD AUTO: 10.3 K/UL (ref 3.6–11)

## 2023-03-23 PROCEDURE — 36415 COLL VENOUS BLD VENIPUNCTURE: CPT

## 2023-03-23 PROCEDURE — 85027 COMPLETE CBC AUTOMATED: CPT

## 2023-03-23 PROCEDURE — 74011250637 HC RX REV CODE- 250/637: Performed by: OBSTETRICS & GYNECOLOGY

## 2023-03-23 PROCEDURE — 80048 BASIC METABOLIC PNL TOTAL CA: CPT

## 2023-03-23 PROCEDURE — 74011250636 HC RX REV CODE- 250/636: Performed by: OBSTETRICS & GYNECOLOGY

## 2023-03-23 RX ORDER — OXYCODONE HYDROCHLORIDE 5 MG/1
5 TABLET ORAL
Qty: 20 TABLET | Refills: 0 | Status: SHIPPED | OUTPATIENT
Start: 2023-03-23 | End: 2023-03-28

## 2023-03-23 RX ORDER — DOCUSATE SODIUM 100 MG/1
100 CAPSULE, LIQUID FILLED ORAL DAILY
Qty: 30 CAPSULE | Refills: 0 | Status: SHIPPED | OUTPATIENT
Start: 2023-03-23 | End: 2023-04-22

## 2023-03-23 RX ORDER — IBUPROFEN 800 MG/1
800 TABLET ORAL
Qty: 40 TABLET | Refills: 0 | Status: SHIPPED | OUTPATIENT
Start: 2023-03-23

## 2023-03-23 RX ADMIN — KETOROLAC TROMETHAMINE 30 MG: 30 INJECTION, SOLUTION INTRAMUSCULAR; INTRAVENOUS at 00:14

## 2023-03-23 RX ADMIN — KETOROLAC TROMETHAMINE 30 MG: 30 INJECTION, SOLUTION INTRAMUSCULAR; INTRAVENOUS at 06:41

## 2023-03-23 RX ADMIN — ACETAMINOPHEN 650 MG: 325 TABLET ORAL at 06:41

## 2023-03-23 RX ADMIN — OXYCODONE HYDROCHLORIDE 5 MG: 5 TABLET ORAL at 10:43

## 2023-03-23 RX ADMIN — ACETAMINOPHEN 650 MG: 325 TABLET ORAL at 00:14

## 2023-03-23 RX ADMIN — LEVOTHYROXINE SODIUM 175 MCG: 0.1 TABLET ORAL at 06:41

## 2023-03-23 NOTE — PROGRESS NOTES
[facility] [address], [address]  P [phone]  F [phone]       Patient Name: Cherly Kanner   Admit Date: 3/22/2023   OR Date: 3/22/2023   Visit Date: 3/23/2023        SUBJECTIVE:    Feeling well this morning. No complaints. Some pain. Better controlled with oxycodone than with ultram.   No nausea. Tolerating a regular diet. Has been able to void. OBJECTIVE:    Patient Vitals for the past 24 hrs:   Temp Pulse Resp BP SpO2   03/23/23 0417 98.7 °F (37.1 °C) 89 16 103/64 99 %   03/23/23 0019 98.3 °F (36.8 °C) 88 16 127/73 96 %   03/22/23 2017 98.5 °F (36.9 °C) 91 16 (!) 141/90 99 %   03/22/23 1516 98.9 °F (37.2 °C) 83 16 138/73 100 %   03/22/23 1409 97.5 °F (36.4 °C) 83 16 (!) 149/88 96 %   03/22/23 1310 -- 82 15 -- 98 %   03/22/23 1305 -- 82 15 -- 99 %   03/22/23 1300 -- 62 16 119/74 98 %   03/22/23 1255 -- (!) 140 16 121/68 100 %   03/22/23 1250 -- 81 16 120/67 100 %   03/22/23 1245 -- 88 11 124/74 100 %   03/22/23 1240 97.8 °F (36.6 °C) 87 12 123/72 100 %   03/22/23 0900 98.5 °F (36.9 °C) 87 16 127/81 99 %       Date 03/22/23 0700 - 03/23/23 0659 03/23/23 0700 - 03/24/23 0659   Shift 5458-3977 7595-3134 24 Hour Total 2737-7235 2031-8261 24 Hour Total   INTAKE   I.V.(mL/kg/hr) 1020(0.9) 1408.3(1.2) 2428.3(1)        Volume (0.9% sodium chloride infusion)  1408.3 1408.3        Volume (lactated Ringers infusion) 1000  1000        Volume (cefOXitin (MEFOXIN) 2 g in sterile water (preservative free) 20 mL iv syringe) 20  20      Shift Total(mL/kg) 1020(10.5) 1408. 3(14.4) 2428. 3(24.9)      OUTPUT   Urine(mL/kg/hr) 175(0.1) 1550(1.3) 1725(0.7)        Urine Output 175  175        Urine Output (mL) ([REMOVED] Urinary Catheter 03/22/23 Vale)  1550 1550      Blood 30  30        Estimated Blood Loss 30  30      Shift Total(mL/kg) 205(2.1) 1550(15.9) 1755(18)       -141.7 673.3      Weight (kg) 97.5 97.5 97.5 97.5 97.5 97.5       Physical Exam     General:  alert, cooperative, no distress     Cardiac:  Regular rate and rhythm        Lungs:  clear to auscultation bilaterally  Abdomen:  soft, non-tender, without masses or organomegaly       Wound:  clean, dry   Extremity: extremities normal, atraumatic, no cyanosis or edema    Data Review  Lab Results   Component Value Date/Time    WBC 10.3 03/23/2023 04:22 AM    ABS. NEUTROPHILS 6.2 03/20/2023 12:31 PM    HGB 6.6 (L) 03/23/2023 04:22 AM    HCT 22.7 (L) 03/23/2023 04:22 AM    MCV 66.2 (L) 03/23/2023 04:22 AM    MCH 19.2 (L) 03/23/2023 04:22 AM    PLATELET 559 (H) 93/78/4832 04:22 AM     Lab Results   Component Value Date/Time    Sodium 139 03/23/2023 04:22 AM    Potassium 3.7 03/23/2023 04:22 AM    Chloride 111 (H) 03/23/2023 04:22 AM    CO2 25 03/23/2023 04:22 AM    Glucose 110 (H) 03/23/2023 04:22 AM    BUN 10 03/23/2023 04:22 AM    Creatinine 0.53 (L) 03/23/2023 04:22 AM    Calcium 8.0 (L) 03/23/2023 04:22 AM    Albumin 4.0 03/20/2023 12:31 PM    Bilirubin, total 0.2 03/20/2023 12:31 PM    ALT (SGPT) 10 03/20/2023 12:31 PM    Alk. phosphatase 78 03/20/2023 12:31 PM     IMPRESSION/PLAN:    1 Day Post-Op Procedure(s):  ROBOTIC ASSISTED TOTAL LAPAROSCOPIC HYSTERECTOMY, LEFT OOPHORECTOMY for UTERINE FIBRIODS    Oncologic:  39 yo WF with a large symptomatic fibroid uterus, with menorrhagia and resulting anemia. She also reports a history of endometriosis in her left ovary. Heme/CV:  Hgb 6.6 this morning. Feeling well though. Has been chronically anemia due to menorrhagia. Discussed blood transfusion but she would like to avoid it if possible    Renal:  Creatinine 0.53.  good urine output overnight. Vale out this morning. Has been able to void. FEN/GI:  No nausea. Diet as tolerated   ID/Wound:  Afebrile. Incisions and abdomen benign. PPX:  SCDs. Incentive spirometer. Continue OOB   Dispostion:  Doing well postoperatively.  Continue routine post op care.  Discharge instructions reviewed with patient. Questions answered. [id number] [address] for discharge.          Monica Newsome PA-C  3/23/2023  7:33 AM

## 2023-03-23 NOTE — PROGRESS NOTES
Bedside and Verbal shift change report given to JAZZ Girard RN (oncoming nurse) by Kiana Hernandez RN (offgoing nurse). Report included the following information SBAR, Kardex, Intake/Output, MAR, and Recent Results. 1118: I have reviewed discharge instructions with the patient and spouse. The patient and spouse verbalized understanding. Discharge medications reviewed with patient and spouse and appropriate educational materials and side effects teaching were provided.

## 2023-03-23 NOTE — DISCHARGE INSTRUCTIONS
[address] [facility] [address]  P [phone]  F [phone]       Devante Balderramar MsTimmy Gee Comment,    Please review your instructions with your nurse and ask any questions so you have all the information you need to recover well at home. If you do not feel you have everything you need to succeed and be safe after you leave the hospital, please discuss these concerns with your nurse. As always, call for any questions at home. Take Home Medications     See Discharge Medication Review provided to you by your nurse. If you did not receive one, request this prior to your discharge. It is important that you take your medications as they are prescribed. Your prescriptions are sent to your pharmacy on record. Keep your medications in the bottles provided by the pharmacist and keep a list of the medication names, dosages, times to be taken and what they are for in your wallet. Do not take other medications without consulting your doctor. If you are prescribed enoxaparin (Lovenox) or Apixaban (Eliquis) following your surgery and are taking a baby aspirin daily, please stop taking the Aspirin until your course of enoxaparin/Eliquis is completed. You may take Tylenol and Ibuprofen or Aleve for pain. If this is not sufficient to control your pain, Tramadol or another pain medication will be prescribed for you. You should take a daily gentle stool softener such as a colace pill or dulcolax suppository for constipation as this is not uncommon after surgery and/or while on pain medication. If not prescribed, this can be found over the counter. If constipation persists for >24 hours you should take a dose of Milk of Magnesia. Call if your constipation continues. Diet    Stay hydrated and drink fluids such as gatorade and water. This will also help prevent constipation and dehydration.  Limit any usual caffeine intake such as soda, tea and coffee as these may serve to dehydrate you. For the first 2-3 days following your procedure, keep a low fiber diet avoiding raw vegetables or fruits with skin. Choose a diet consisting of soup, cereal, yogurt, eggs, fish, Boost/Ensure. Avoid fatty/greasy foods. If nauseated, keep your diet limited to liquids and call if this persists. Activity    If possible, have someone with you at all times until you feel stable. Gradually increase your activity each day. There are generally no restrictions on walking, climbing stairs or riding in a car. Walk at least 4 times per day. Walking will help reduce the risk of blood blots, constipation and pneumonia. Kevin Sexton are okay. If you have an incision, no tub bathing/swimming for 3-4 weeks. No swimming or other submerging under water for the same amount of time. No driving for 2 week and/or while on pain medication. If you had surgery, the following restrictions are in place:  No heavy lifting greater than 10 lbs for the first 3 weeks following surgery and then no more than 25 lbs for the next 3 weeks. If you had a hysterectomy, nothing per vagina for 6-8 weeks. You may experience vaginal spotting/discharge following your procedure. Should the bleeding require more than 4 pads a day, please call the office. Incision    You should expect some discomfort in the area of your incision, particularly as you increase your activity. If you notice an area of increasing redness or new drainage, please call your doctor. Staples are generally removed in 10-14 days. Many incisions will have buried absorbable sutures, which do not need to be removed and are covered by protective glue. Please allow this glue to come off on its own. Causes For Concern    If any of the following occur, please call our office and speak with the Nurse/aid who will help you with your problem or ask the doctor to call you.     Problems with the incision, including increasing pain, swelling, redness or drainage. Inability to pass urine   Increasing abdominal pain despite medication  Persisting nausea or vomiting. Fever or chills and a temperature >101F  Constipation (no bowel movement for three days). Diarrhea (more than three watery stools within 24 hours). Excessive vaginal or wound bleeding. If after hours and you cannot reach an on-call physician, call 911. Follow-Up    Call [phone] to schedule an appointment with Dr. Tiffany Cavanaugh in 4 week. Information obtained by :  I understand that if any problems occur once I am at home I am to contact my physician. I understand and acknowledge receipt of the instructions indicated above.                                                                                                                                            Physician's or R.N.'s Signature                                                                  Date/Time                                                                                                                                            Patient or Representative Signature                                                          Date/Time

## 2023-04-18 ENCOUNTER — OFFICE VISIT (OUTPATIENT)
Dept: GYNECOLOGY | Age: 43
End: 2023-04-18
Payer: COMMERCIAL

## 2023-04-18 VITALS
BODY MASS INDEX: 35.32 KG/M2 | HEART RATE: 98 BPM | WEIGHT: 212 LBS | HEIGHT: 65 IN | SYSTOLIC BLOOD PRESSURE: 132 MMHG | DIASTOLIC BLOOD PRESSURE: 80 MMHG

## 2023-04-18 DIAGNOSIS — D25.1 INTRAMURAL LEIOMYOMA OF UTERUS: Primary | ICD-10-CM

## 2023-04-18 DIAGNOSIS — N80.129 ENDOMETRIOMA OF OVARY: ICD-10-CM

## 2023-04-18 PROCEDURE — 99024 POSTOP FOLLOW-UP VISIT: CPT | Performed by: OBSTETRICS & GYNECOLOGY

## 2023-04-18 NOTE — PROGRESS NOTES
[facility] [address], [address]  P [phone]  F [phone]    Postoperative Office Note  Patient ID:  Name: Inés Farnsworth  MRM: [id number]  : 1980/42 y.o. Date: 2023    Diagnosis:     ICD-10-CM ICD-9-CM    1. Intramural leiomyoma of uterus  D25.1 218.1       2. Endometrioma of ovary  N80.129 617.1           Problem List:   Patient Active Problem List   Diagnosis Code    Hypothyroidism E03.9    Uterine fibroid D25.9    Advanced care planning/counseling discussion Z71.89    Leiomyoma of uterus D25.9       SUBJECTIVE:    Inés Farnsworth is a 38 y.o. female who presents for followup postoperative care following robotic-assisted total laparoscopic hysterectomy, bilateral salpingectomy, left oophorectomy. Operative findings:  Enlarged fibroid uterus with a dominant posterior fibroid representing most of the mass of the uterus. One large anterior fibroid, as well as a large intracavitary fibroid. Normal tubes bilaterally. Left ovary with a large endometrioma, containing \"chocolate cyst\" fluid. Normal right ovary. Normal cervix. Old blood noted in the pelvis and peritoneal cavity, consistent with retrograde menstruation. The patient's pathology revealed: FINAL PATHOLOGIC DIAGNOSIS   1. Uterus with cervix, bilateral fallopian tubes, hysterectomy, bilateral salpingectomy:   Cervix with no histological abnormalities. Benign basal endometrium, negative for hyperplasia and malignancy. Myometrium with leiomyomas and adenomyosis. Benign bilateral fallopian tubes with fimbria. 2. Ovary, left, oophorectomy:   Ovarian parenchyma with endometrioma. No dysplasia or malignancy identified. Currently she has no problems with eating, bowel movements, voiding, or their wound. Appetite is good. Eating a regular diet without difficulty. Bowel movements are regular. The patient is not having any pain.     Medications:     Current Outpatient Medications on File Prior to Visit   Medication Sig Dispense Refill    docusate sodium (COLACE) 100 mg capsule Take 1 Capsule by mouth daily for 30 days. Stop taking if you develop loose stools  Indications: constipation 30 Capsule 0    ibuprofen (MOTRIN) 800 mg tablet Take 1 Tablet by mouth every six (6) hours as needed for Pain. 40 Tablet 0    ibuprofen (MOTRIN) 200 mg tablet Take  by mouth every six (6) hours as needed for Pain.      levothyroxine (SYNTHROID) 175 mcg tablet Take 1 Tablet by mouth Daily (before breakfast). ergocalciferol (ERGOCALCIFEROL) 1,250 mcg (50,000 unit) capsule TAKE 1 CAPSULE BY MOUTH WEEKLY FOR 90 DAYS       No current facility-administered medications on file prior to visit. Allergies: Allergies   Allergen Reactions    Amoxicillin Other (comments)     Unknown; pt states she had a reaction as a baby. Past Medical History:   Diagnosis Date    Chronic pain     Endometriosis 2023    Hypothyroidism 07/2015    Dr. Reena Hinojosa    Irregular menstruation     Melanoma of skin ([facility] 06/2016    R distal thigh (Dr. Trey Moran at [facility]) - later pathology showed NOT melanoma    Uterine fibroid 03/2016     Past Surgical History:   Procedure Laterality Date    HX HEENT      WISDOM TEETH EXTRACTION    HX ORTHOPAEDIC Right 2016    KNEE BIOPSY    HX OTHER SURGICAL Right 09/2015    dermatofibroma excision (upper R thigh), Dr. Velia Smith     [address] Right 09/14/2016    spitz nevus exc R knee (pathology revealed scar), Dr. Trey Moran at [address] [place] [address] History     Socioeconomic History    Marital status:      Spouse name: Not on file    Number of children: Not on file    Years of education: Not on file    Highest education level: Not on file   Occupational History    Not on file   Tobacco Use    Smoking status: Never    Smokeless tobacco: Never   Vaping Use    Vaping Use: Never used   Substance and Sexual Activity    Alcohol use:  Yes Comment: 4 beers/wine a week    Drug use: No    Sexual activity: Yes     Partners: Male   Other Topics Concern    Not on file   Social History Narrative    Not on file     Social Determinants of Health     Financial Resource Strain: Not on file   Food Insecurity: Not on file   Transportation Needs: Not on file   Physical Activity: Not on file   Stress: Not on file   Social Connections: Not on file   Intimate Partner Violence: Not on file   Housing Stability: Not on file       OBJECTIVE:    Vitals:   Vitals:    04/18/23 0947   BP: 132/80   Pulse: 98   Weight: 212 lb (96.2 kg)   Height: 5' 5\" (1.651 m)     Physical Examination:     General:  alert, cooperative, no distress   Lungs: lungs clear to auscultation   Cardiac: Regular rate and rhythm   Abdomen: soft, bowel sounds active, non-tender  No CVA tenderness   Incision: healing well   Pelvic: External genitalia: normal general appearance  Urinary system: urethral meatus normal  Vaginal: normal without tenderness, induration or masses  Cervix: removed surgically  Adnexa: non palpable  Uterus: removed surgically   Rectal: not done   Extremity:   extremities normal, atraumatic, no cyanosis or edema     IMPRESSION:    Se Rocha is doing well postoperatively. She has no evidence of malignancy and no apparent complications from surgery. Medical problems:     Patient Active Problem List   Diagnosis Code    Hypothyroidism E03.9    Uterine fibroid D25.9    Advanced care planning/counseling discussion Z71.89    Leiomyoma of uterus D25.9       PLAN:    The operative procedures and clinical results have been reviewed with the patient. Implications of diagnosis discussed at length. All questions answered. The patient may return to all of her normal activities without restrictions at this time. I will see the patient back prn. She may follow up with Dr. Sara Jarrell for her regular gynecologic care.   The patient is advised to call our office with any problems or concerns. An electronic signature was used to sign this note.     Sandy Hinojosa MD  4/18/2023

## 2024-11-04 ENCOUNTER — APPOINTMENT (OUTPATIENT)
Facility: HOSPITAL | Age: 44
End: 2024-11-04
Payer: COMMERCIAL

## 2024-11-04 ENCOUNTER — HOSPITAL ENCOUNTER (EMERGENCY)
Facility: HOSPITAL | Age: 44
Discharge: HOME | End: 2024-11-04
Attending: EMERGENCY MEDICINE
Payer: COMMERCIAL

## 2024-11-04 VITALS
HEART RATE: 88 BPM | OXYGEN SATURATION: 98 % | TEMPERATURE: 98.3 F | SYSTOLIC BLOOD PRESSURE: 114 MMHG | RESPIRATION RATE: 18 BRPM | BODY MASS INDEX: 35.52 KG/M2 | DIASTOLIC BLOOD PRESSURE: 56 MMHG | WEIGHT: 213.19 LBS | HEIGHT: 65 IN

## 2024-11-04 DIAGNOSIS — G44.319 ACUTE POST-TRAUMATIC HEADACHE, NOT INTRACTABLE: ICD-10-CM
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
## 2024-11-04 PROCEDURE — 6370000000 HC RX 637 (ALT 250 FOR IP)

## 2024-11-04 PROCEDURE — 99284 EMERGENCY DEPT VISIT MOD MDM: CPT

## 2024-11-04 PROCEDURE — 70450 CT HEAD/BRAIN W/O DYE: CPT

## 2024-11-04 RX ORDER — ACETAMINOPHEN 325 MG/1
650 TABLET ORAL ONCE
Status: COMPLETED | OUTPATIENT
Start: 2024-11-04 | End: 2024-11-04

## 2024-11-04 RX ORDER — METHOCARBAMOL 750 MG/1
750 TABLET, FILM COATED ORAL 4 TIMES DAILY
Qty: 40 TABLET | Refills: 0 | Status: SHIPPED | OUTPATIENT
Start: 2024-11-04 | End: 2024-11-14

## 2024-11-04 RX ORDER — LIDOCAINE 4 G/G
1 PATCH TOPICAL DAILY
Qty: 30 PATCH | Refills: 0 | Status: SHIPPED | OUTPATIENT
Start: 2024-11-04 | End: 2024-12-04

## 2024-11-04 RX ADMIN — ACETAMINOPHEN 650 MG: 325 TABLET ORAL at 17:28

## 2024-11-04 ASSESSMENT — PAIN - FUNCTIONAL ASSESSMENT
PAIN_FUNCTIONAL_ASSESSMENT: ACTIVITIES ARE NOT PREVENTED
PAIN_FUNCTIONAL_ASSESSMENT: ACTIVITIES ARE NOT PREVENTED
PAIN_FUNCTIONAL_ASSESSMENT: 0-10

## 2024-11-04 ASSESSMENT — PAIN DESCRIPTION - FREQUENCY: FREQUENCY: CONTINUOUS

## 2024-11-04 ASSESSMENT — PAIN DESCRIPTION - ONSET: ONSET: SUDDEN

## 2024-11-04 ASSESSMENT — PAIN DESCRIPTION - DESCRIPTORS
DESCRIPTORS: ACHING
DESCRIPTORS: ACHING

## 2024-11-04 ASSESSMENT — PAIN DESCRIPTION - ORIENTATION
ORIENTATION: POSTERIOR
ORIENTATION: UPPER

## 2024-11-04 ASSESSMENT — PAIN DESCRIPTION - LOCATION
LOCATION: HEAD
LOCATION: HEAD

## 2024-11-04 ASSESSMENT — PAIN SCALES - GENERAL
PAINLEVEL_OUTOF10: 2
PAINLEVEL_OUTOF10: 2

## 2024-11-04 ASSESSMENT — PAIN DESCRIPTION - PAIN TYPE: TYPE: ACUTE PAIN

## 2024-11-04 NOTE — DISCHARGE INSTRUCTIONS
Discussed visit today. Please follow-up with PCP as needed.     Return to the ER with any worsening of symptoms.
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
## (undated) DEVICE — PACK,BASIC,SIRUS,V: Brand: MEDLINE

## (undated) DEVICE — VCARE LARGE, UTERINE MANIPULATOR, VAGINAL-CERVICAL-AHLUWALIA'S-RETRACTOR-ELEVATOR: Brand: VCARE

## (undated) DEVICE — NDL SPNE QNCKE 22GX3.5IN LF --

## (undated) DEVICE — TISSUE RETRIEVAL SYSTEM: Brand: INZII RETRIEVAL SYSTEM

## (undated) DEVICE — SCISSORS RMFG LAPAROSC 5MM -- LAWSON OEM ITEM 112765

## (undated) DEVICE — GLOVE,SURG,SENSICARE,ALOE,LF,PF,7: Brand: MEDLINE

## (undated) DEVICE — TRI-LUMEN FILTERED TUBE SET WITH ACTIVATED CHARCOAL FILTER: Brand: AIRSEAL

## (undated) DEVICE — GLOVE SURG SZ 8 L12IN FNGR THK94MIL STD WHT LTX FREE

## (undated) DEVICE — ELECTRO LUBE IS A SINGLE PATIENT USE DEVICE THAT IS INTENDED TO BE USED ON ELECTROSURGICAL ELECTRODES TO REDUCE STICKING.: Brand: KEY SURGICAL ELECTRO LUBE

## (undated) DEVICE — SUTURE SZ 0 27IN 5/8 CIR UR-6  TAPER PT VIOLET ABSRB VICRYL J603H

## (undated) DEVICE — PAD PT POS 36 IN SURGYPAD DISP

## (undated) DEVICE — SEAL UNIV 5-8MM DISP BX/10 -- DA VINCI XI - SNGL USE

## (undated) DEVICE — AIRSEAL 8 MM ACCESS PORT AND LOW PROFILE OBTURATOR WITH BLADELESS OPTICAL TIP, 120 MM LENGTH: Brand: AIRSEAL

## (undated) DEVICE — GYN LAPAROSCOPY - SMH: Brand: MEDLINE INDUSTRIES, INC.

## (undated) DEVICE — TAPE,CLOTH/SILK,CURAD,3"X10YD,LF,40/CS: Brand: CURAD

## (undated) DEVICE — Z DUPLICATE USE 2246581 COVER MPLR TIP CRV SCIS ACC DA VINCI

## (undated) DEVICE — DRAPE ARM BX/20 -- DA VINCI XI

## (undated) DEVICE — GLOVE SURG SZ 7 L12IN FNGR THK79MIL GRN LTX FREE

## (undated) DEVICE — SOLUTION IRRIG 1000ML 0.9% SOD CHL USP POUR PLAS BTL

## (undated) DEVICE — GLOVE SURG 7.5 11.7IN BEAD CUF LIGHT BRN SENSICARE LTX FREE

## (undated) DEVICE — SUTURE MCRYL SZ 4-0 L27IN ABSRB UD L19MM PS-2 1/2 CIR PRIM Y426H

## (undated) DEVICE — Device

## (undated) DEVICE — BAG SPEC REM 224ML W4XL6IN DIA10MM 1 HND GYN DISP ENDOPCH

## (undated) DEVICE — BLADE ASSEMB CLP HAIR FINE --

## (undated) DEVICE — OBTRTR BLDELSS OPT 8MM DISP -- DA VINICI XI - SNGL USE

## (undated) DEVICE — SUTURE VCRL SZ 2-0 L27IN ABSRB UD L26MM SH 1/2 CIR J417H

## (undated) DEVICE — INSUFFLATION NEEDLE: Brand: SURGINEEDLE

## (undated) DEVICE — SUTURE ABSORBABLE MONOFILAMENT 2-0 8 IN ANTIBACT STRATAFIX SXMP1B409